# Patient Record
Sex: FEMALE | Race: BLACK OR AFRICAN AMERICAN | NOT HISPANIC OR LATINO | Employment: OTHER | URBAN - NONMETROPOLITAN AREA
[De-identification: names, ages, dates, MRNs, and addresses within clinical notes are randomized per-mention and may not be internally consistent; named-entity substitution may affect disease eponyms.]

---

## 2019-01-01 ENCOUNTER — ANTICOAGULATION VISIT (OUTPATIENT)
Dept: CARDIAC SURGERY | Facility: CLINIC | Age: 79
End: 2019-01-01

## 2019-01-01 ENCOUNTER — APPOINTMENT (OUTPATIENT)
Dept: CARDIOLOGY | Facility: HOSPITAL | Age: 79
End: 2019-01-01

## 2019-01-01 ENCOUNTER — APPOINTMENT (OUTPATIENT)
Dept: CT IMAGING | Facility: HOSPITAL | Age: 79
End: 2019-01-01

## 2019-01-01 ENCOUNTER — READMISSION MANAGEMENT (OUTPATIENT)
Dept: CALL CENTER | Facility: HOSPITAL | Age: 79
End: 2019-01-01

## 2019-01-01 ENCOUNTER — LAB REQUISITION (OUTPATIENT)
Dept: LAB | Facility: HOSPITAL | Age: 79
End: 2019-01-01

## 2019-01-01 ENCOUNTER — HOSPITAL ENCOUNTER (INPATIENT)
Facility: HOSPITAL | Age: 79
LOS: 5 days | Discharge: HOME HEALTH | End: 2019-11-08
Attending: INTERNAL MEDICINE | Admitting: HOSPITALIST

## 2019-01-01 VITALS
HEART RATE: 64 BPM | OXYGEN SATURATION: 94 % | BODY MASS INDEX: 20.45 KG/M2 | TEMPERATURE: 97.3 F | WEIGHT: 119.8 LBS | SYSTOLIC BLOOD PRESSURE: 92 MMHG | DIASTOLIC BLOOD PRESSURE: 64 MMHG | HEIGHT: 64 IN | RESPIRATION RATE: 18 BRPM

## 2019-01-01 DIAGNOSIS — I21.4 NSTEMI (NON-ST ELEVATED MYOCARDIAL INFARCTION) (HCC): Primary | ICD-10-CM

## 2019-01-01 DIAGNOSIS — I21.4 NON-ST ELEVATION (NSTEMI) MYOCARDIAL INFARCTION (HCC): ICD-10-CM

## 2019-01-01 DIAGNOSIS — Z74.09 IMPAIRED PHYSICAL MOBILITY: ICD-10-CM

## 2019-01-01 DIAGNOSIS — I26.99 OTHER ACUTE PULMONARY EMBOLISM WITHOUT ACUTE COR PULMONALE (HCC): ICD-10-CM

## 2019-01-01 DIAGNOSIS — E87.5 HYPERKALEMIA: ICD-10-CM

## 2019-01-01 DIAGNOSIS — I11.9 HYPERTENSIVE HEART DISEASE WITHOUT HEART FAILURE: ICD-10-CM

## 2019-01-01 DIAGNOSIS — R42 ORTHOSTATIC DIZZINESS: ICD-10-CM

## 2019-01-01 LAB
ALBUMIN SERPL-MCNC: 3.5 G/DL (ref 3.5–5.2)
ALBUMIN SERPL-MCNC: 3.9 G/DL (ref 3.5–5.2)
ALBUMIN/GLOB SERPL: 1.1 G/DL
ALBUMIN/GLOB SERPL: 1.3 G/DL
ALP SERPL-CCNC: 113 U/L (ref 39–117)
ALP SERPL-CCNC: 92 U/L (ref 39–117)
ALT SERPL W P-5'-P-CCNC: 15 U/L (ref 1–33)
ALT SERPL W P-5'-P-CCNC: 21 U/L (ref 1–33)
ANION GAP SERPL CALCULATED.3IONS-SCNC: 10 MMOL/L (ref 5–15)
ANION GAP SERPL CALCULATED.3IONS-SCNC: 14 MMOL/L (ref 5–15)
ANION GAP SERPL CALCULATED.3IONS-SCNC: 14 MMOL/L (ref 5–15)
ANION GAP SERPL CALCULATED.3IONS-SCNC: 16 MMOL/L (ref 5–15)
ANION GAP SERPL CALCULATED.3IONS-SCNC: 26 MMOL/L (ref 5–15)
ANION GAP SERPL CALCULATED.3IONS-SCNC: 9 MMOL/L (ref 5–15)
AST SERPL-CCNC: 26 U/L (ref 1–32)
AST SERPL-CCNC: 36 U/L (ref 1–32)
BASOPHILS # BLD AUTO: 0.02 10*3/MM3 (ref 0–0.2)
BASOPHILS # BLD AUTO: 0.03 10*3/MM3 (ref 0–0.2)
BASOPHILS # BLD AUTO: 0.04 10*3/MM3 (ref 0–0.2)
BASOPHILS # BLD AUTO: 0.04 10*3/MM3 (ref 0–0.2)
BASOPHILS NFR BLD AUTO: 0.5 % (ref 0–1.5)
BASOPHILS NFR BLD AUTO: 0.5 % (ref 0–1.5)
BASOPHILS NFR BLD AUTO: 0.6 % (ref 0–1.5)
BASOPHILS NFR BLD AUTO: 0.7 % (ref 0–1.5)
BASOPHILS NFR BLD AUTO: 0.8 % (ref 0–1.5)
BASOPHILS NFR BLD AUTO: 1 % (ref 0–1.5)
BH CV ECHO MEAS - ACS: 1.7 CM
BH CV ECHO MEAS - AO ISTHMUS: 2.1 CM
BH CV ECHO MEAS - AO MAX PG (FULL): 4.4 MMHG
BH CV ECHO MEAS - AO MAX PG: 6.1 MMHG
BH CV ECHO MEAS - AO MEAN PG (FULL): 2 MMHG
BH CV ECHO MEAS - AO MEAN PG: 3 MMHG
BH CV ECHO MEAS - AO ROOT AREA (BSA CORRECTED): 1.9
BH CV ECHO MEAS - AO ROOT AREA: 7.5 CM^2
BH CV ECHO MEAS - AO ROOT DIAM: 3.1 CM
BH CV ECHO MEAS - AO V2 MAX: 123 CM/SEC
BH CV ECHO MEAS - AO V2 MEAN: 80.9 CM/SEC
BH CV ECHO MEAS - AO V2 VTI: 18.4 CM
BH CV ECHO MEAS - ASC AORTA: 2.6 CM
BH CV ECHO MEAS - AVA(I,A): 1.8 CM^2
BH CV ECHO MEAS - AVA(I,D): 1.8 CM^2
BH CV ECHO MEAS - AVA(V,A): 1.3 CM^2
BH CV ECHO MEAS - AVA(V,D): 1.3 CM^2
BH CV ECHO MEAS - BSA(HAYCOCK): 1.6 M^2
BH CV ECHO MEAS - BSA: 1.6 M^2
BH CV ECHO MEAS - BZI_BMI: 21.1 KILOGRAMS/M^2
BH CV ECHO MEAS - BZI_METRIC_HEIGHT: 162.6 CM
BH CV ECHO MEAS - BZI_METRIC_WEIGHT: 55.8 KG
BH CV ECHO MEAS - EDV(CUBED): 24.6 ML
BH CV ECHO MEAS - EDV(TEICH): 32.5 ML
BH CV ECHO MEAS - EF(CUBED): 69 %
BH CV ECHO MEAS - EF(TEICH): 62.3 %
BH CV ECHO MEAS - ESV(CUBED): 7.6 ML
BH CV ECHO MEAS - ESV(TEICH): 12.2 ML
BH CV ECHO MEAS - FS: 32.3 %
BH CV ECHO MEAS - IVS/LVPW: 0.97
BH CV ECHO MEAS - IVSD: 1.2 CM
BH CV ECHO MEAS - LA DIMENSION: 2.5 CM
BH CV ECHO MEAS - LA/AO: 0.81
BH CV ECHO MEAS - LV MASS(C)D: 100.5 GRAMS
BH CV ECHO MEAS - LV MASS(C)DI: 63.2 GRAMS/M^2
BH CV ECHO MEAS - LV MAX PG: 1.7 MMHG
BH CV ECHO MEAS - LV MEAN PG: 1 MMHG
BH CV ECHO MEAS - LV V1 MAX: 65 CM/SEC
BH CV ECHO MEAS - LV V1 MEAN: 50 CM/SEC
BH CV ECHO MEAS - LV V1 VTI: 13.3 CM
BH CV ECHO MEAS - LVIDD: 2.9 CM
BH CV ECHO MEAS - LVIDS: 2 CM
BH CV ECHO MEAS - LVOT AREA (M): 2.5 CM^2
BH CV ECHO MEAS - LVOT AREA: 2.5 CM^2
BH CV ECHO MEAS - LVOT DIAM: 1.8 CM
BH CV ECHO MEAS - LVPWD: 1.2 CM
BH CV ECHO MEAS - MV A MAX VEL: 69.5 CM/SEC
BH CV ECHO MEAS - MV DEC SLOPE: 263 CM/SEC^2
BH CV ECHO MEAS - MV E MAX VEL: 28.5 CM/SEC
BH CV ECHO MEAS - MV E/A: 0.41
BH CV ECHO MEAS - MV MAX PG: 4.8 MMHG
BH CV ECHO MEAS - MV MEAN PG: 2 MMHG
BH CV ECHO MEAS - MV P1/2T MAX VEL: 45.2 CM/SEC
BH CV ECHO MEAS - MV P1/2T: 50.3 MSEC
BH CV ECHO MEAS - MV V2 MAX: 110 CM/SEC
BH CV ECHO MEAS - MV V2 MEAN: 62.5 CM/SEC
BH CV ECHO MEAS - MV V2 VTI: 15.2 CM
BH CV ECHO MEAS - MVA P1/2T LCG: 4.9 CM^2
BH CV ECHO MEAS - MVA(P1/2T): 4.4 CM^2
BH CV ECHO MEAS - MVA(VTI): 2.2 CM^2
BH CV ECHO MEAS - PA MAX PG: 3 MMHG
BH CV ECHO MEAS - PA V2 MAX: 86.1 CM/SEC
BH CV ECHO MEAS - RAP SYSTOLE: 10 MMHG
BH CV ECHO MEAS - RVDD: 3.1 CM
BH CV ECHO MEAS - RVSP: 151.6 MMHG
BH CV ECHO MEAS - SI(AO): 87.3 ML/M^2
BH CV ECHO MEAS - SI(CUBED): 10.7 ML/M^2
BH CV ECHO MEAS - SI(LVOT): 21.3 ML/M^2
BH CV ECHO MEAS - SI(TEICH): 12.7 ML/M^2
BH CV ECHO MEAS - SV(AO): 138.9 ML
BH CV ECHO MEAS - SV(CUBED): 17 ML
BH CV ECHO MEAS - SV(LVOT): 33.8 ML
BH CV ECHO MEAS - SV(TEICH): 20.2 ML
BH CV ECHO MEAS - TR MAX VEL: 595 CM/SEC
BILIRUB SERPL-MCNC: 0.7 MG/DL (ref 0.2–1.2)
BILIRUB SERPL-MCNC: 0.7 MG/DL (ref 0.2–1.2)
BUN BLD-MCNC: 10 MG/DL (ref 8–23)
BUN BLD-MCNC: 11 MG/DL (ref 8–23)
BUN BLD-MCNC: 13 MG/DL (ref 8–23)
BUN BLD-MCNC: 13 MG/DL (ref 8–23)
BUN BLD-MCNC: 14 MG/DL (ref 8–23)
BUN BLD-MCNC: 9 MG/DL (ref 8–23)
BUN/CREAT SERPL: 11.4 (ref 7–25)
BUN/CREAT SERPL: 12.8 (ref 7–25)
BUN/CREAT SERPL: 14.3 (ref 7–25)
BUN/CREAT SERPL: 16.9 (ref 7–25)
BUN/CREAT SERPL: 17.1 (ref 7–25)
BUN/CREAT SERPL: 18.1 (ref 7–25)
CALCIUM SPEC-SCNC: 8.7 MG/DL (ref 8.6–10.5)
CALCIUM SPEC-SCNC: 9.3 MG/DL (ref 8.6–10.5)
CALCIUM SPEC-SCNC: 9.4 MG/DL (ref 8.6–10.5)
CALCIUM SPEC-SCNC: 9.6 MG/DL (ref 8.6–10.5)
CALCIUM SPEC-SCNC: 9.6 MG/DL (ref 8.6–10.5)
CALCIUM SPEC-SCNC: 9.8 MG/DL (ref 8.6–10.5)
CHLORIDE SERPL-SCNC: 100 MMOL/L (ref 98–107)
CHLORIDE SERPL-SCNC: 100 MMOL/L (ref 98–107)
CHLORIDE SERPL-SCNC: 103 MMOL/L (ref 98–107)
CHLORIDE SERPL-SCNC: 105 MMOL/L (ref 98–107)
CHLORIDE SERPL-SCNC: 99 MMOL/L (ref 98–107)
CHLORIDE SERPL-SCNC: 99 MMOL/L (ref 98–107)
CHOLEST SERPL-MCNC: 168 MG/DL (ref 0–200)
CO2 SERPL-SCNC: 15 MMOL/L (ref 22–29)
CO2 SERPL-SCNC: 19 MMOL/L (ref 22–29)
CO2 SERPL-SCNC: 22 MMOL/L (ref 22–29)
CO2 SERPL-SCNC: 23 MMOL/L (ref 22–29)
CO2 SERPL-SCNC: 24 MMOL/L (ref 22–29)
CO2 SERPL-SCNC: 26 MMOL/L (ref 22–29)
CREAT BLD-MCNC: 0.72 MG/DL (ref 0.57–1)
CREAT BLD-MCNC: 0.77 MG/DL (ref 0.57–1)
CREAT BLD-MCNC: 0.77 MG/DL (ref 0.57–1)
CREAT BLD-MCNC: 0.78 MG/DL (ref 0.57–1)
CREAT BLD-MCNC: 0.79 MG/DL (ref 0.57–1)
CREAT BLD-MCNC: 0.82 MG/DL (ref 0.57–1)
D-DIMER, QUANTITATIVE (MAD,POW, STR): >4000 NG/ML (FEU) (ref 0–470)
DEPRECATED RDW RBC AUTO: 36.3 FL (ref 37–54)
DEPRECATED RDW RBC AUTO: 36.5 FL (ref 37–54)
DEPRECATED RDW RBC AUTO: 37.1 FL (ref 37–54)
DEPRECATED RDW RBC AUTO: 37.4 FL (ref 37–54)
DEPRECATED RDW RBC AUTO: 37.6 FL (ref 37–54)
DEPRECATED RDW RBC AUTO: 38.6 FL (ref 37–54)
DEPRECATED RDW RBC AUTO: 51.4 FL (ref 37–54)
EOSINOPHIL # BLD AUTO: 0.01 10*3/MM3 (ref 0–0.4)
EOSINOPHIL # BLD AUTO: 0.03 10*3/MM3 (ref 0–0.4)
EOSINOPHIL # BLD AUTO: 0.03 10*3/MM3 (ref 0–0.4)
EOSINOPHIL # BLD AUTO: 0.05 10*3/MM3 (ref 0–0.4)
EOSINOPHIL # BLD AUTO: 0.05 10*3/MM3 (ref 0–0.4)
EOSINOPHIL # BLD AUTO: 0.08 10*3/MM3 (ref 0–0.4)
EOSINOPHIL NFR BLD AUTO: 0.2 % (ref 0.3–6.2)
EOSINOPHIL NFR BLD AUTO: 0.6 % (ref 0.3–6.2)
EOSINOPHIL NFR BLD AUTO: 0.7 % (ref 0.3–6.2)
EOSINOPHIL NFR BLD AUTO: 1.1 % (ref 0.3–6.2)
EOSINOPHIL NFR BLD AUTO: 1.2 % (ref 0.3–6.2)
EOSINOPHIL NFR BLD AUTO: 1.9 % (ref 0.3–6.2)
ERYTHROCYTE [DISTWIDTH] IN BLOOD BY AUTOMATED COUNT: 14.6 % (ref 12.3–15.4)
ERYTHROCYTE [DISTWIDTH] IN BLOOD BY AUTOMATED COUNT: 14.7 % (ref 12.3–15.4)
ERYTHROCYTE [DISTWIDTH] IN BLOOD BY AUTOMATED COUNT: 14.7 % (ref 12.3–15.4)
ERYTHROCYTE [DISTWIDTH] IN BLOOD BY AUTOMATED COUNT: 14.8 % (ref 12.3–15.4)
ERYTHROCYTE [DISTWIDTH] IN BLOOD BY AUTOMATED COUNT: 15.3 % (ref 12.3–15.4)
ERYTHROCYTE [DISTWIDTH] IN BLOOD BY AUTOMATED COUNT: 15.7 % (ref 12.3–15.4)
ERYTHROCYTE [DISTWIDTH] IN BLOOD BY AUTOMATED COUNT: 19.4 % (ref 12.3–15.4)
GFR SERPL CREATININE-BSD FRML MDRD: 81 ML/MIN/1.73
GFR SERPL CREATININE-BSD FRML MDRD: 85 ML/MIN/1.73
GFR SERPL CREATININE-BSD FRML MDRD: 86 ML/MIN/1.73
GFR SERPL CREATININE-BSD FRML MDRD: 88 ML/MIN/1.73
GFR SERPL CREATININE-BSD FRML MDRD: 88 ML/MIN/1.73
GFR SERPL CREATININE-BSD FRML MDRD: 95 ML/MIN/1.73
GLOBULIN UR ELPH-MCNC: 3 GM/DL
GLOBULIN UR ELPH-MCNC: 3.2 GM/DL
GLUCOSE BLD-MCNC: 32 MG/DL (ref 65–99)
GLUCOSE BLD-MCNC: 53 MG/DL (ref 65–99)
GLUCOSE BLD-MCNC: 60 MG/DL (ref 65–99)
GLUCOSE BLD-MCNC: 68 MG/DL (ref 65–99)
GLUCOSE BLD-MCNC: 72 MG/DL (ref 65–99)
GLUCOSE BLD-MCNC: 93 MG/DL (ref 65–99)
GLUCOSE BLDC GLUCOMTR-MCNC: 100 MG/DL (ref 70–130)
GLUCOSE BLDC GLUCOMTR-MCNC: 101 MG/DL (ref 70–130)
GLUCOSE BLDC GLUCOMTR-MCNC: 103 MG/DL (ref 70–130)
GLUCOSE BLDC GLUCOMTR-MCNC: 105 MG/DL (ref 70–130)
GLUCOSE BLDC GLUCOMTR-MCNC: 106 MG/DL (ref 70–130)
GLUCOSE BLDC GLUCOMTR-MCNC: 107 MG/DL (ref 70–130)
GLUCOSE BLDC GLUCOMTR-MCNC: 113 MG/DL (ref 70–130)
GLUCOSE BLDC GLUCOMTR-MCNC: 246 MG/DL (ref 70–130)
GLUCOSE BLDC GLUCOMTR-MCNC: 30 MG/DL (ref 70–130)
GLUCOSE BLDC GLUCOMTR-MCNC: 60 MG/DL (ref 70–130)
GLUCOSE BLDC GLUCOMTR-MCNC: 61 MG/DL (ref 70–130)
GLUCOSE BLDC GLUCOMTR-MCNC: 71 MG/DL (ref 70–130)
GLUCOSE BLDC GLUCOMTR-MCNC: 77 MG/DL (ref 70–130)
GLUCOSE BLDC GLUCOMTR-MCNC: 94 MG/DL (ref 70–130)
HBA1C MFR BLD: 5.3 % (ref 4.8–5.6)
HCT VFR BLD AUTO: 35.5 % (ref 34–46.6)
HCT VFR BLD AUTO: 36.8 % (ref 34–46.6)
HCT VFR BLD AUTO: 38.3 % (ref 34–46.6)
HCT VFR BLD AUTO: 39.8 % (ref 34–46.6)
HCT VFR BLD AUTO: 42.2 % (ref 34–46.6)
HCT VFR BLD AUTO: 44.4 % (ref 34–46.6)
HCT VFR BLD AUTO: 47.5 % (ref 34–46.6)
HDLC SERPL-MCNC: 36 MG/DL (ref 40–60)
HGB BLD-MCNC: 12.9 G/DL (ref 12–15.9)
HGB BLD-MCNC: 13.4 G/DL (ref 12–15.9)
HGB BLD-MCNC: 13.8 G/DL (ref 12–15.9)
HGB BLD-MCNC: 14.5 G/DL (ref 12–15.9)
HGB BLD-MCNC: 15.1 G/DL (ref 12–15.9)
HGB BLD-MCNC: 16 G/DL (ref 12–15.9)
HGB BLD-MCNC: 16.6 G/DL (ref 12–15.9)
IMM GRANULOCYTES # BLD AUTO: 0.01 10*3/MM3 (ref 0–0.05)
IMM GRANULOCYTES # BLD AUTO: 0.02 10*3/MM3 (ref 0–0.05)
IMM GRANULOCYTES NFR BLD AUTO: 0.2 % (ref 0–0.5)
IMM GRANULOCYTES NFR BLD AUTO: 0.3 % (ref 0–0.5)
LDLC SERPL CALC-MCNC: 111 MG/DL (ref 0–100)
LDLC/HDLC SERPL: 3.08 {RATIO}
LYMPHOCYTES # BLD AUTO: 1.11 10*3/MM3 (ref 0.7–3.1)
LYMPHOCYTES # BLD AUTO: 1.6 10*3/MM3 (ref 0.7–3.1)
LYMPHOCYTES # BLD AUTO: 1.62 10*3/MM3 (ref 0.7–3.1)
LYMPHOCYTES # BLD AUTO: 1.64 10*3/MM3 (ref 0.7–3.1)
LYMPHOCYTES # BLD AUTO: 1.7 10*3/MM3 (ref 0.7–3.1)
LYMPHOCYTES # BLD AUTO: 1.77 10*3/MM3 (ref 0.7–3.1)
LYMPHOCYTES NFR BLD AUTO: 26.4 % (ref 19.6–45.3)
LYMPHOCYTES NFR BLD AUTO: 27 % (ref 19.6–45.3)
LYMPHOCYTES NFR BLD AUTO: 32.3 % (ref 19.6–45.3)
LYMPHOCYTES NFR BLD AUTO: 35.9 % (ref 19.6–45.3)
LYMPHOCYTES NFR BLD AUTO: 39 % (ref 19.6–45.3)
LYMPHOCYTES NFR BLD AUTO: 43.2 % (ref 19.6–45.3)
MAGNESIUM SERPL-MCNC: 1.7 MG/DL (ref 1.6–2.4)
MAXIMAL PREDICTED HEART RATE: 141 BPM
MCH RBC QN AUTO: 25.7 PG (ref 26.6–33)
MCH RBC QN AUTO: 25.8 PG (ref 26.6–33)
MCH RBC QN AUTO: 25.9 PG (ref 26.6–33)
MCH RBC QN AUTO: 26 PG (ref 26.6–33)
MCH RBC QN AUTO: 27.5 PG (ref 26.6–33)
MCHC RBC AUTO-ENTMCNC: 34.9 G/DL (ref 31.5–35.7)
MCHC RBC AUTO-ENTMCNC: 35.8 G/DL (ref 31.5–35.7)
MCHC RBC AUTO-ENTMCNC: 36 G/DL (ref 31.5–35.7)
MCHC RBC AUTO-ENTMCNC: 36 G/DL (ref 31.5–35.7)
MCHC RBC AUTO-ENTMCNC: 36.3 G/DL (ref 31.5–35.7)
MCHC RBC AUTO-ENTMCNC: 36.4 G/DL (ref 31.5–35.7)
MCHC RBC AUTO-ENTMCNC: 36.4 G/DL (ref 31.5–35.7)
MCV RBC AUTO: 70.8 FL (ref 79–97)
MCV RBC AUTO: 71.1 FL (ref 79–97)
MCV RBC AUTO: 71.4 FL (ref 79–97)
MCV RBC AUTO: 71.8 FL (ref 79–97)
MCV RBC AUTO: 72.1 FL (ref 79–97)
MCV RBC AUTO: 73.5 FL (ref 79–97)
MCV RBC AUTO: 75.7 FL (ref 79–97)
MONOCYTES # BLD AUTO: 0.45 10*3/MM3 (ref 0.1–0.9)
MONOCYTES # BLD AUTO: 0.48 10*3/MM3 (ref 0.1–0.9)
MONOCYTES # BLD AUTO: 0.5 10*3/MM3 (ref 0.1–0.9)
MONOCYTES # BLD AUTO: 0.56 10*3/MM3 (ref 0.1–0.9)
MONOCYTES # BLD AUTO: 0.57 10*3/MM3 (ref 0.1–0.9)
MONOCYTES # BLD AUTO: 0.63 10*3/MM3 (ref 0.1–0.9)
MONOCYTES NFR BLD AUTO: 10.9 % (ref 5–12)
MONOCYTES NFR BLD AUTO: 12.1 % (ref 5–12)
MONOCYTES NFR BLD AUTO: 13.7 % (ref 5–12)
MONOCYTES NFR BLD AUTO: 15.2 % (ref 5–12)
MONOCYTES NFR BLD AUTO: 8.3 % (ref 5–12)
MONOCYTES NFR BLD AUTO: 9.4 % (ref 5–12)
NEUTROPHILS # BLD AUTO: 1.7 10*3/MM3 (ref 1.7–7)
NEUTROPHILS # BLD AUTO: 1.77 10*3/MM3 (ref 1.7–7)
NEUTROPHILS # BLD AUTO: 2.37 10*3/MM3 (ref 1.7–7)
NEUTROPHILS # BLD AUTO: 2.47 10*3/MM3 (ref 1.7–7)
NEUTROPHILS # BLD AUTO: 2.88 10*3/MM3 (ref 1.7–7)
NEUTROPHILS # BLD AUTO: 3.89 10*3/MM3 (ref 1.7–7)
NEUTROPHILS NFR BLD AUTO: 41.5 % (ref 42.7–76)
NEUTROPHILS NFR BLD AUTO: 42.7 % (ref 42.7–76)
NEUTROPHILS NFR BLD AUTO: 50.1 % (ref 42.7–76)
NEUTROPHILS NFR BLD AUTO: 56.7 % (ref 42.7–76)
NEUTROPHILS NFR BLD AUTO: 60.2 % (ref 42.7–76)
NEUTROPHILS NFR BLD AUTO: 64.3 % (ref 42.7–76)
NRBC BLD AUTO-RTO: 0 /100 WBC (ref 0–0.2)
NRBC BLD AUTO-RTO: 0.5 /100 WBC (ref 0–0.2)
NRBC BLD AUTO-RTO: 0.6 /100 WBC (ref 0–0.2)
NT-PROBNP SERPL-MCNC: 4654 PG/ML (ref 5–1800)
NT-PROBNP SERPL-MCNC: 8066 PG/ML (ref 5–1800)
PHOSPHATE SERPL-MCNC: 3.6 MG/DL (ref 2.5–4.5)
PLATELET # BLD AUTO: 167 10*3/MM3 (ref 140–450)
PLATELET # BLD AUTO: 168 10*3/MM3 (ref 140–450)
PLATELET # BLD AUTO: 174 10*3/MM3 (ref 140–450)
PLATELET # BLD AUTO: 179 10*3/MM3 (ref 140–450)
PLATELET # BLD AUTO: 185 10*3/MM3 (ref 140–450)
PMV BLD AUTO: 10 FL (ref 6–12)
PMV BLD AUTO: 10.1 FL (ref 6–12)
PMV BLD AUTO: 10.3 FL (ref 6–12)
PMV BLD AUTO: 9.6 FL (ref 6–12)
PMV BLD AUTO: 9.6 FL (ref 6–12)
PMV BLD AUTO: 9.7 FL (ref 6–12)
PMV BLD AUTO: 9.9 FL (ref 6–12)
POTASSIUM BLD-SCNC: 2.9 MMOL/L (ref 3.5–5.2)
POTASSIUM BLD-SCNC: 3.9 MMOL/L (ref 3.5–5.2)
POTASSIUM BLD-SCNC: 3.9 MMOL/L (ref 3.5–5.2)
POTASSIUM BLD-SCNC: 4 MMOL/L (ref 3.5–5.2)
POTASSIUM BLD-SCNC: 5 MMOL/L (ref 3.5–5.2)
POTASSIUM BLD-SCNC: 5.4 MMOL/L (ref 3.5–5.2)
POTASSIUM BLD-SCNC: 5.4 MMOL/L (ref 3.5–5.2)
POTASSIUM BLD-SCNC: 6.2 MMOL/L (ref 3.5–5.2)
PROT SERPL-MCNC: 6.7 G/DL (ref 6–8.5)
PROT SERPL-MCNC: 6.9 G/DL (ref 6–8.5)
RBC # BLD AUTO: 4.69 10*6/MM3 (ref 3.77–5.28)
RBC # BLD AUTO: 5.2 10*6/MM3 (ref 3.77–5.28)
RBC # BLD AUTO: 5.31 10*6/MM3 (ref 3.77–5.28)
RBC # BLD AUTO: 5.6 10*6/MM3 (ref 3.77–5.28)
RBC # BLD AUTO: 5.88 10*6/MM3 (ref 3.77–5.28)
RBC # BLD AUTO: 6.22 10*6/MM3 (ref 3.77–5.28)
RBC # BLD AUTO: 6.46 10*6/MM3 (ref 3.77–5.28)
RBC MORPH BLD: NORMAL
SMALL PLATELETS BLD QL SMEAR: ADEQUATE
SODIUM BLD-SCNC: 132 MMOL/L (ref 136–145)
SODIUM BLD-SCNC: 135 MMOL/L (ref 136–145)
SODIUM BLD-SCNC: 137 MMOL/L (ref 136–145)
SODIUM BLD-SCNC: 138 MMOL/L (ref 136–145)
SODIUM BLD-SCNC: 141 MMOL/L (ref 136–145)
SODIUM BLD-SCNC: 141 MMOL/L (ref 136–145)
STRESS TARGET HR: 120 BPM
TRIGL SERPL-MCNC: 105 MG/DL (ref 0–150)
TROPONIN T SERPL-MCNC: 0.18 NG/ML (ref 0–0.03)
TROPONIN T SERPL-MCNC: 0.18 NG/ML (ref 0–0.03)
TSH SERPL DL<=0.05 MIU/L-ACNC: 2.41 UIU/ML (ref 0.27–4.2)
VLDLC SERPL-MCNC: 21 MG/DL
WBC MORPH BLD: NORMAL
WBC NRBC COR # BLD: 4.1 10*3/MM3 (ref 3.4–10.8)
WBC NRBC COR # BLD: 4.11 10*3/MM3 (ref 3.4–10.8)
WBC NRBC COR # BLD: 4.15 10*3/MM3 (ref 3.4–10.8)
WBC NRBC COR # BLD: 4.73 10*3/MM3 (ref 3.4–10.8)
WBC NRBC COR # BLD: 5.08 10*3/MM3 (ref 3.4–10.8)
WBC NRBC COR # BLD: 6.05 10*3/MM3 (ref 3.4–10.8)
WBC NRBC COR # BLD: 6.18 10*3/MM3 (ref 3.4–10.8)

## 2019-01-01 PROCEDURE — 80048 BASIC METABOLIC PNL TOTAL CA: CPT | Performed by: INTERNAL MEDICINE

## 2019-01-01 PROCEDURE — 99233 SBSQ HOSP IP/OBS HIGH 50: CPT | Performed by: INTERNAL MEDICINE

## 2019-01-01 PROCEDURE — 99232 SBSQ HOSP IP/OBS MODERATE 35: CPT | Performed by: INTERNAL MEDICINE

## 2019-01-01 PROCEDURE — 25010000002 FUROSEMIDE PER 20 MG: Performed by: INTERNAL MEDICINE

## 2019-01-01 PROCEDURE — 82962 GLUCOSE BLOOD TEST: CPT

## 2019-01-01 PROCEDURE — 85027 COMPLETE CBC AUTOMATED: CPT | Performed by: INTERNAL MEDICINE

## 2019-01-01 PROCEDURE — 84443 ASSAY THYROID STIM HORMONE: CPT | Performed by: INTERNAL MEDICINE

## 2019-01-01 PROCEDURE — 25010000002 HEPARIN (PORCINE) PER 1000 UNITS: Performed by: INTERNAL MEDICINE

## 2019-01-01 PROCEDURE — 94799 UNLISTED PULMONARY SVC/PX: CPT

## 2019-01-01 PROCEDURE — 84484 ASSAY OF TROPONIN QUANT: CPT | Performed by: INTERNAL MEDICINE

## 2019-01-01 PROCEDURE — 71275 CT ANGIOGRAPHY CHEST: CPT

## 2019-01-01 PROCEDURE — 83880 ASSAY OF NATRIURETIC PEPTIDE: CPT | Performed by: INTERNAL MEDICINE

## 2019-01-01 PROCEDURE — 85025 COMPLETE CBC W/AUTO DIFF WBC: CPT | Performed by: HOSPITALIST

## 2019-01-01 PROCEDURE — 63710000001 INSULIN REGULAR HUMAN PER 5 UNITS: Performed by: INTERNAL MEDICINE

## 2019-01-01 PROCEDURE — 80048 BASIC METABOLIC PNL TOTAL CA: CPT | Performed by: HOSPITALIST

## 2019-01-01 PROCEDURE — C1760 CLOSURE DEV, VASC: HCPCS | Performed by: INTERNAL MEDICINE

## 2019-01-01 PROCEDURE — 85007 BL SMEAR W/DIFF WBC COUNT: CPT | Performed by: HOSPITALIST

## 2019-01-01 PROCEDURE — 25010000002 CALCIUM GLUCONATE PER 10 ML: Performed by: INTERNAL MEDICINE

## 2019-01-01 PROCEDURE — 94760 N-INVAS EAR/PLS OXIMETRY 1: CPT

## 2019-01-01 PROCEDURE — 80053 COMPREHEN METABOLIC PANEL: CPT | Performed by: FAMILY MEDICINE

## 2019-01-01 PROCEDURE — 84132 ASSAY OF SERUM POTASSIUM: CPT | Performed by: INTERNAL MEDICINE

## 2019-01-01 PROCEDURE — 99233 SBSQ HOSP IP/OBS HIGH 50: CPT | Performed by: NURSE PRACTITIONER

## 2019-01-01 PROCEDURE — 84132 ASSAY OF SERUM POTASSIUM: CPT | Performed by: HOSPITALIST

## 2019-01-01 PROCEDURE — B2151ZZ FLUOROSCOPY OF LEFT HEART USING LOW OSMOLAR CONTRAST: ICD-10-PCS | Performed by: INTERNAL MEDICINE

## 2019-01-01 PROCEDURE — 83036 HEMOGLOBIN GLYCOSYLATED A1C: CPT | Performed by: INTERNAL MEDICINE

## 2019-01-01 PROCEDURE — 84100 ASSAY OF PHOSPHORUS: CPT | Performed by: INTERNAL MEDICINE

## 2019-01-01 PROCEDURE — 85025 COMPLETE CBC W/AUTO DIFF WBC: CPT | Performed by: FAMILY MEDICINE

## 2019-01-01 PROCEDURE — 80053 COMPREHEN METABOLIC PANEL: CPT | Performed by: INTERNAL MEDICINE

## 2019-01-01 PROCEDURE — 0 IOPAMIDOL PER 1 ML: Performed by: INTERNAL MEDICINE

## 2019-01-01 PROCEDURE — 85025 COMPLETE CBC W/AUTO DIFF WBC: CPT | Performed by: INTERNAL MEDICINE

## 2019-01-01 PROCEDURE — 25010000002 ONDANSETRON PER 1 MG: Performed by: INTERNAL MEDICINE

## 2019-01-01 PROCEDURE — 93458 L HRT ARTERY/VENTRICLE ANGIO: CPT | Performed by: INTERNAL MEDICINE

## 2019-01-01 PROCEDURE — 80061 LIPID PANEL: CPT | Performed by: INTERNAL MEDICINE

## 2019-01-01 PROCEDURE — 93306 TTE W/DOPPLER COMPLETE: CPT

## 2019-01-01 PROCEDURE — B2111ZZ FLUOROSCOPY OF MULTIPLE CORONARY ARTERIES USING LOW OSMOLAR CONTRAST: ICD-10-PCS | Performed by: INTERNAL MEDICINE

## 2019-01-01 PROCEDURE — C1769 GUIDE WIRE: HCPCS | Performed by: INTERNAL MEDICINE

## 2019-01-01 PROCEDURE — C1894 INTRO/SHEATH, NON-LASER: HCPCS | Performed by: INTERNAL MEDICINE

## 2019-01-01 PROCEDURE — 25010000002 MIDAZOLAM PER 1 MG: Performed by: INTERNAL MEDICINE

## 2019-01-01 PROCEDURE — 97110 THERAPEUTIC EXERCISES: CPT

## 2019-01-01 PROCEDURE — 83735 ASSAY OF MAGNESIUM: CPT | Performed by: INTERNAL MEDICINE

## 2019-01-01 PROCEDURE — 63710000001 INSULIN ASPART PER 5 UNITS: Performed by: HOSPITALIST

## 2019-01-01 PROCEDURE — 4A023N7 MEASUREMENT OF CARDIAC SAMPLING AND PRESSURE, LEFT HEART, PERCUTANEOUS APPROACH: ICD-10-PCS | Performed by: INTERNAL MEDICINE

## 2019-01-01 PROCEDURE — 85379 FIBRIN DEGRADATION QUANT: CPT | Performed by: INTERNAL MEDICINE

## 2019-01-01 PROCEDURE — 97530 THERAPEUTIC ACTIVITIES: CPT

## 2019-01-01 PROCEDURE — 97162 PT EVAL MOD COMPLEX 30 MIN: CPT

## 2019-01-01 PROCEDURE — 93010 ELECTROCARDIOGRAM REPORT: CPT | Performed by: INTERNAL MEDICINE

## 2019-01-01 PROCEDURE — 93306 TTE W/DOPPLER COMPLETE: CPT | Performed by: INTERNAL MEDICINE

## 2019-01-01 PROCEDURE — 99223 1ST HOSP IP/OBS HIGH 75: CPT | Performed by: INTERNAL MEDICINE

## 2019-01-01 PROCEDURE — 93005 ELECTROCARDIOGRAM TRACING: CPT | Performed by: INTERNAL MEDICINE

## 2019-01-01 RX ORDER — ONDANSETRON 4 MG/1
4 TABLET, FILM COATED ORAL EVERY 6 HOURS PRN
Status: DISCONTINUED | OUTPATIENT
Start: 2019-01-01 | End: 2019-01-01 | Stop reason: SDUPTHER

## 2019-01-01 RX ORDER — ONDANSETRON 2 MG/ML
4 INJECTION INTRAMUSCULAR; INTRAVENOUS EVERY 6 HOURS PRN
Status: DISCONTINUED | OUTPATIENT
Start: 2019-01-01 | End: 2019-01-01 | Stop reason: HOSPADM

## 2019-01-01 RX ORDER — DOCUSATE SODIUM 100 MG/1
100 CAPSULE, LIQUID FILLED ORAL 2 TIMES DAILY
Status: DISCONTINUED | OUTPATIENT
Start: 2019-01-01 | End: 2019-01-01 | Stop reason: HOSPADM

## 2019-01-01 RX ORDER — MAGNESIUM SULFATE HEPTAHYDRATE 40 MG/ML
2 INJECTION, SOLUTION INTRAVENOUS AS NEEDED
Status: DISCONTINUED | OUTPATIENT
Start: 2019-01-01 | End: 2019-01-01 | Stop reason: HOSPADM

## 2019-01-01 RX ORDER — DEXTROSE MONOHYDRATE 25 G/50ML
50 INJECTION, SOLUTION INTRAVENOUS ONCE
Status: COMPLETED | OUTPATIENT
Start: 2019-01-01 | End: 2019-01-01

## 2019-01-01 RX ORDER — FUROSEMIDE 10 MG/ML
40 INJECTION INTRAMUSCULAR; INTRAVENOUS EVERY 12 HOURS
Status: COMPLETED | OUTPATIENT
Start: 2019-01-01 | End: 2019-01-01

## 2019-01-01 RX ORDER — SODIUM CHLORIDE 0.9 % (FLUSH) 0.9 %
10 SYRINGE (ML) INJECTION AS NEEDED
Status: DISCONTINUED | OUTPATIENT
Start: 2019-01-01 | End: 2019-01-01 | Stop reason: HOSPADM

## 2019-01-01 RX ORDER — FENTANYL CITRATE 50 UG/ML
25 INJECTION, SOLUTION INTRAMUSCULAR; INTRAVENOUS
Status: DISCONTINUED | OUTPATIENT
Start: 2019-01-01 | End: 2019-01-01 | Stop reason: SDUPTHER

## 2019-01-01 RX ORDER — IPRATROPIUM BROMIDE AND ALBUTEROL SULFATE 2.5; .5 MG/3ML; MG/3ML
SOLUTION RESPIRATORY (INHALATION)
COMMUNITY

## 2019-01-01 RX ORDER — HEPARIN SODIUM 1000 [USP'U]/ML
INJECTION, SOLUTION INTRAVENOUS; SUBCUTANEOUS AS NEEDED
Status: DISCONTINUED | OUTPATIENT
Start: 2019-01-01 | End: 2019-01-01 | Stop reason: HOSPADM

## 2019-01-01 RX ORDER — FUROSEMIDE 20 MG/1
20 TABLET ORAL DAILY
COMMUNITY
End: 2019-01-01 | Stop reason: HOSPADM

## 2019-01-01 RX ORDER — FAMOTIDINE 40 MG/1
40 TABLET, FILM COATED ORAL DAILY
Status: DISCONTINUED | OUTPATIENT
Start: 2019-01-01 | End: 2019-01-01 | Stop reason: HOSPADM

## 2019-01-01 RX ORDER — DEXTROSE MONOHYDRATE 25 G/50ML
25 INJECTION, SOLUTION INTRAVENOUS
Status: DISCONTINUED | OUTPATIENT
Start: 2019-01-01 | End: 2019-01-01 | Stop reason: SDUPTHER

## 2019-01-01 RX ORDER — LANOLIN ALCOHOL/MO/W.PET/CERES
3 CREAM (GRAM) TOPICAL NIGHTLY PRN
Qty: 30 TABLET | Refills: 0 | Status: SHIPPED | OUTPATIENT
Start: 2019-01-01

## 2019-01-01 RX ORDER — TEMAZEPAM 7.5 MG/1
7.5 CAPSULE ORAL NIGHTLY PRN
Status: DISCONTINUED | OUTPATIENT
Start: 2019-01-01 | End: 2019-01-01 | Stop reason: HOSPADM

## 2019-01-01 RX ORDER — LANCETS
1 EACH MISCELLANEOUS 4 TIMES DAILY
Qty: 100 EACH | Refills: 0 | Status: SHIPPED | OUTPATIENT
Start: 2019-01-01

## 2019-01-01 RX ORDER — ONDANSETRON 2 MG/ML
4 INJECTION INTRAMUSCULAR; INTRAVENOUS EVERY 6 HOURS PRN
Status: DISCONTINUED | OUTPATIENT
Start: 2019-01-01 | End: 2019-01-01 | Stop reason: SDUPTHER

## 2019-01-01 RX ORDER — METOPROLOL SUCCINATE 25 MG/1
12.5 TABLET, EXTENDED RELEASE ORAL
Qty: 15 TABLET | Refills: 0 | Status: SHIPPED | OUTPATIENT
Start: 2019-01-01 | End: 2019-01-01

## 2019-01-01 RX ORDER — OMEPRAZOLE 20 MG/1
20 CAPSULE, DELAYED RELEASE ORAL 2 TIMES DAILY
COMMUNITY
End: 2019-01-01 | Stop reason: HOSPADM

## 2019-01-01 RX ORDER — NICOTINE POLACRILEX 4 MG
15 LOZENGE BUCCAL
Status: DISCONTINUED | OUTPATIENT
Start: 2019-01-01 | End: 2019-01-01 | Stop reason: SDUPTHER

## 2019-01-01 RX ORDER — ACETAMINOPHEN 325 MG/1
650 TABLET ORAL EVERY 4 HOURS PRN
Status: DISCONTINUED | OUTPATIENT
Start: 2019-01-01 | End: 2019-01-01 | Stop reason: HOSPADM

## 2019-01-01 RX ORDER — SODIUM CHLORIDE 9 MG/ML
75 INJECTION, SOLUTION INTRAVENOUS CONTINUOUS
Status: DISCONTINUED | OUTPATIENT
Start: 2019-01-01 | End: 2019-01-01

## 2019-01-01 RX ORDER — NALOXONE HCL 0.4 MG/ML
0.4 VIAL (ML) INJECTION
Status: DISCONTINUED | OUTPATIENT
Start: 2019-01-01 | End: 2019-01-01 | Stop reason: HOSPADM

## 2019-01-01 RX ORDER — SODIUM POLYSTYRENE SULFONATE 15 G/60ML
15 SUSPENSION ORAL; RECTAL ONCE
Status: COMPLETED | OUTPATIENT
Start: 2019-01-01 | End: 2019-01-01

## 2019-01-01 RX ORDER — CLOPIDOGREL BISULFATE 75 MG/1
75 TABLET ORAL DAILY
Status: DISCONTINUED | OUTPATIENT
Start: 2019-01-01 | End: 2019-01-01 | Stop reason: HOSPADM

## 2019-01-01 RX ORDER — ACETAMINOPHEN 160 MG/5ML
650 SOLUTION ORAL EVERY 4 HOURS PRN
Status: DISCONTINUED | OUTPATIENT
Start: 2019-01-01 | End: 2019-01-01 | Stop reason: SDUPTHER

## 2019-01-01 RX ORDER — MORPHINE SULFATE 2 MG/ML
1 INJECTION, SOLUTION INTRAMUSCULAR; INTRAVENOUS EVERY 4 HOURS PRN
Status: DISCONTINUED | OUTPATIENT
Start: 2019-01-01 | End: 2019-01-01 | Stop reason: HOSPADM

## 2019-01-01 RX ORDER — POTASSIUM CHLORIDE 750 MG/1
40 CAPSULE, EXTENDED RELEASE ORAL AS NEEDED
Status: DISCONTINUED | OUTPATIENT
Start: 2019-01-01 | End: 2019-01-01 | Stop reason: HOSPADM

## 2019-01-01 RX ORDER — ISOSORBIDE MONONITRATE 30 MG/1
30 TABLET, EXTENDED RELEASE ORAL
Status: DISCONTINUED | OUTPATIENT
Start: 2019-01-01 | End: 2019-01-01

## 2019-01-01 RX ORDER — POTASSIUM CHLORIDE 1.5 G/1.77G
40 POWDER, FOR SOLUTION ORAL AS NEEDED
Status: DISCONTINUED | OUTPATIENT
Start: 2019-01-01 | End: 2019-01-01 | Stop reason: HOSPADM

## 2019-01-01 RX ORDER — POTASSIUM CHLORIDE 750 MG/1
10 CAPSULE, EXTENDED RELEASE ORAL DAILY
COMMUNITY
End: 2019-01-01 | Stop reason: HOSPADM

## 2019-01-01 RX ORDER — PSEUDOEPHEDRINE HCL 30 MG
100 TABLET ORAL 2 TIMES DAILY
Qty: 60 EACH | Refills: 0 | Status: SHIPPED | OUTPATIENT
Start: 2019-01-01 | End: 2019-01-01

## 2019-01-01 RX ORDER — SODIUM CHLORIDE 9 MG/ML
100 INJECTION, SOLUTION INTRAVENOUS CONTINUOUS
Status: DISCONTINUED | OUTPATIENT
Start: 2019-01-01 | End: 2019-01-01

## 2019-01-01 RX ORDER — ALUMINA, MAGNESIA, AND SIMETHICONE 2400; 2400; 240 MG/30ML; MG/30ML; MG/30ML
15 SUSPENSION ORAL EVERY 6 HOURS PRN
Status: DISCONTINUED | OUTPATIENT
Start: 2019-01-01 | End: 2019-01-01

## 2019-01-01 RX ORDER — METOPROLOL SUCCINATE 25 MG
12.5 TABLET, EXTENDED RELEASE 24 HR ORAL
Status: DISCONTINUED | OUTPATIENT
Start: 2019-01-01 | End: 2019-01-01 | Stop reason: HOSPADM

## 2019-01-01 RX ORDER — LANOLIN ALCOHOL/MO/W.PET/CERES
3 CREAM (GRAM) TOPICAL NIGHTLY PRN
Status: DISCONTINUED | OUTPATIENT
Start: 2019-01-01 | End: 2019-01-01 | Stop reason: HOSPADM

## 2019-01-01 RX ORDER — NALOXONE HCL 0.4 MG/ML
0.4 VIAL (ML) INJECTION
Status: DISCONTINUED | OUTPATIENT
Start: 2019-01-01 | End: 2019-01-01 | Stop reason: SDUPTHER

## 2019-01-01 RX ORDER — SODIUM CHLORIDE 0.9 % (FLUSH) 0.9 %
10 SYRINGE (ML) INJECTION EVERY 12 HOURS SCHEDULED
Status: DISCONTINUED | OUTPATIENT
Start: 2019-01-01 | End: 2019-01-01 | Stop reason: HOSPADM

## 2019-01-01 RX ORDER — CARVEDILOL 3.12 MG/1
3.12 TABLET ORAL 2 TIMES DAILY WITH MEALS
COMMUNITY
End: 2019-01-01 | Stop reason: HOSPADM

## 2019-01-01 RX ORDER — LIDOCAINE HYDROCHLORIDE 20 MG/ML
INJECTION, SOLUTION INFILTRATION; PERINEURAL AS NEEDED
Status: DISCONTINUED | OUTPATIENT
Start: 2019-01-01 | End: 2019-01-01 | Stop reason: HOSPADM

## 2019-01-01 RX ORDER — ACETAMINOPHEN 650 MG/1
650 SUPPOSITORY RECTAL EVERY 4 HOURS PRN
Status: DISCONTINUED | OUTPATIENT
Start: 2019-01-01 | End: 2019-01-01 | Stop reason: SDUPTHER

## 2019-01-01 RX ORDER — MIDAZOLAM HYDROCHLORIDE 1 MG/ML
INJECTION INTRAMUSCULAR; INTRAVENOUS AS NEEDED
Status: DISCONTINUED | OUTPATIENT
Start: 2019-01-01 | End: 2019-01-01 | Stop reason: HOSPADM

## 2019-01-01 RX ORDER — DEXTROSE MONOHYDRATE 25 G/50ML
25 INJECTION, SOLUTION INTRAVENOUS
Status: DISCONTINUED | OUTPATIENT
Start: 2019-01-01 | End: 2019-01-01 | Stop reason: HOSPADM

## 2019-01-01 RX ORDER — HYDROCODONE BITARTRATE AND ACETAMINOPHEN 7.5; 325 MG/1; MG/1
1 TABLET ORAL EVERY 4 HOURS PRN
Status: DISCONTINUED | OUTPATIENT
Start: 2019-01-01 | End: 2019-01-01 | Stop reason: HOSPADM

## 2019-01-01 RX ORDER — BLOOD-GLUCOSE METER
1 KIT MISCELLANEOUS AS NEEDED
Qty: 1 EACH | Refills: 0 | Status: SHIPPED | OUTPATIENT
Start: 2019-01-01

## 2019-01-01 RX ORDER — SODIUM CHLORIDE 0.9 % (FLUSH) 0.9 %
3 SYRINGE (ML) INJECTION EVERY 12 HOURS SCHEDULED
Status: DISCONTINUED | OUTPATIENT
Start: 2019-01-01 | End: 2019-01-01 | Stop reason: HOSPADM

## 2019-01-01 RX ORDER — MIDODRINE HYDROCHLORIDE 2.5 MG/1
2.5 TABLET ORAL
Status: DISCONTINUED | OUTPATIENT
Start: 2019-01-01 | End: 2019-01-01

## 2019-01-01 RX ORDER — ACETAMINOPHEN 325 MG/1
650 TABLET ORAL EVERY 4 HOURS PRN
Status: DISCONTINUED | OUTPATIENT
Start: 2019-01-01 | End: 2019-01-01 | Stop reason: SDUPTHER

## 2019-01-01 RX ORDER — RANITIDINE 150 MG/1
150 TABLET ORAL 2 TIMES DAILY
COMMUNITY
End: 2019-01-01 | Stop reason: HOSPADM

## 2019-01-01 RX ORDER — PRAVASTATIN SODIUM 40 MG
80 TABLET ORAL DAILY
COMMUNITY
End: 2020-01-01 | Stop reason: HOSPADM

## 2019-01-01 RX ORDER — CLOPIDOGREL BISULFATE 75 MG/1
75 TABLET ORAL DAILY
Qty: 30 TABLET | Refills: 0 | Status: SHIPPED | OUTPATIENT
Start: 2019-01-01 | End: 2019-01-01

## 2019-01-01 RX ORDER — FAMOTIDINE 40 MG/1
40 TABLET, FILM COATED ORAL DAILY
Qty: 30 TABLET | Refills: 0 | Status: SHIPPED | OUTPATIENT
Start: 2019-01-01 | End: 2019-01-01

## 2019-01-01 RX ORDER — NICOTINE POLACRILEX 4 MG
15 LOZENGE BUCCAL
Status: DISCONTINUED | OUTPATIENT
Start: 2019-01-01 | End: 2019-01-01 | Stop reason: HOSPADM

## 2019-01-01 RX ORDER — DEXTROSE MONOHYDRATE 25 G/50ML
50 INJECTION, SOLUTION INTRAVENOUS
Status: DISCONTINUED | OUTPATIENT
Start: 2019-01-01 | End: 2019-01-01 | Stop reason: HOSPADM

## 2019-01-01 RX ORDER — MAGNESIUM SULFATE HEPTAHYDRATE 40 MG/ML
4 INJECTION, SOLUTION INTRAVENOUS AS NEEDED
Status: DISCONTINUED | OUTPATIENT
Start: 2019-01-01 | End: 2019-01-01 | Stop reason: HOSPADM

## 2019-01-01 RX ORDER — SODIUM CHLORIDE 9 MG/ML
1 INJECTION, SOLUTION INTRAVENOUS CONTINUOUS
Status: ACTIVE | OUTPATIENT
Start: 2019-01-01 | End: 2019-01-01

## 2019-01-01 RX ORDER — SODIUM CHLORIDE 9 MG/ML
50 INJECTION, SOLUTION INTRAVENOUS CONTINUOUS
Status: DISCONTINUED | OUTPATIENT
Start: 2019-01-01 | End: 2019-01-01 | Stop reason: SDUPTHER

## 2019-01-01 RX ORDER — POTASSIUM CHLORIDE 7.45 MG/ML
10 INJECTION INTRAVENOUS
Status: DISCONTINUED | OUTPATIENT
Start: 2019-01-01 | End: 2019-01-01 | Stop reason: HOSPADM

## 2019-01-01 RX ORDER — MORPHINE SULFATE 2 MG/ML
1 INJECTION, SOLUTION INTRAMUSCULAR; INTRAVENOUS EVERY 4 HOURS PRN
Status: DISCONTINUED | OUTPATIENT
Start: 2019-01-01 | End: 2019-01-01 | Stop reason: SDUPTHER

## 2019-01-01 RX ORDER — ONDANSETRON 4 MG/1
4 TABLET, FILM COATED ORAL EVERY 6 HOURS PRN
Status: DISCONTINUED | OUTPATIENT
Start: 2019-01-01 | End: 2019-01-01 | Stop reason: HOSPADM

## 2019-01-01 RX ORDER — DIPHENHYDRAMINE HYDROCHLORIDE 50 MG/ML
25 INJECTION INTRAMUSCULAR; INTRAVENOUS EVERY 6 HOURS PRN
Status: DISCONTINUED | OUTPATIENT
Start: 2019-01-01 | End: 2019-01-01 | Stop reason: HOSPADM

## 2019-01-01 RX ORDER — DEXTROSE MONOHYDRATE 25 G/50ML
50 INJECTION, SOLUTION INTRAVENOUS
Status: DISCONTINUED | OUTPATIENT
Start: 2019-01-01 | End: 2019-01-01 | Stop reason: SDUPTHER

## 2019-01-01 RX ADMIN — Medication 12.5 MG: at 10:46

## 2019-01-01 RX ADMIN — SODIUM CHLORIDE 75 ML/HR: 9 INJECTION, SOLUTION INTRAVENOUS at 08:41

## 2019-01-01 RX ADMIN — RIVAROXABAN 15 MG: 15 TABLET, FILM COATED ORAL at 08:12

## 2019-01-01 RX ADMIN — POTASSIUM CHLORIDE 40 MEQ: 750 CAPSULE, EXTENDED RELEASE ORAL at 16:43

## 2019-01-01 RX ADMIN — DEXTROSE MONOHYDRATE 50 ML: 500 INJECTION PARENTERAL at 07:44

## 2019-01-01 RX ADMIN — ONDANSETRON 4 MG: 2 INJECTION INTRAMUSCULAR; INTRAVENOUS at 08:40

## 2019-01-01 RX ADMIN — CLOPIDOGREL BISULFATE 75 MG: 75 TABLET ORAL at 09:56

## 2019-01-01 RX ADMIN — FUROSEMIDE 40 MG: 10 INJECTION, SOLUTION INTRAMUSCULAR; INTRAVENOUS at 17:32

## 2019-01-01 RX ADMIN — FAMOTIDINE 40 MG: 40 TABLET ORAL at 08:23

## 2019-01-01 RX ADMIN — POTASSIUM CHLORIDE 40 MEQ: 750 CAPSULE, EXTENDED RELEASE ORAL at 12:16

## 2019-01-01 RX ADMIN — DOCUSATE SODIUM 100 MG: 100 CAPSULE, LIQUID FILLED ORAL at 20:41

## 2019-01-01 RX ADMIN — FAMOTIDINE 40 MG: 40 TABLET ORAL at 09:56

## 2019-01-01 RX ADMIN — FUROSEMIDE 40 MG: 10 INJECTION, SOLUTION INTRAMUSCULAR; INTRAVENOUS at 05:59

## 2019-01-01 RX ADMIN — SODIUM CHLORIDE, PRESERVATIVE FREE 10 ML: 5 INJECTION INTRAVENOUS at 00:27

## 2019-01-01 RX ADMIN — CLOPIDOGREL BISULFATE 75 MG: 75 TABLET ORAL at 15:36

## 2019-01-01 RX ADMIN — RIVAROXABAN 15 MG: 15 TABLET, FILM COATED ORAL at 08:23

## 2019-01-01 RX ADMIN — SODIUM CHLORIDE 50 ML/HR: 900 INJECTION, SOLUTION INTRAVENOUS at 00:54

## 2019-01-01 RX ADMIN — CLOPIDOGREL BISULFATE 75 MG: 75 TABLET ORAL at 08:12

## 2019-01-01 RX ADMIN — SODIUM CHLORIDE 100 ML/HR: 9 INJECTION, SOLUTION INTRAVENOUS at 06:37

## 2019-01-01 RX ADMIN — HYDROCODONE BITARTRATE AND ACETAMINOPHEN 1 TABLET: 7.5; 325 TABLET ORAL at 17:56

## 2019-01-01 RX ADMIN — SODIUM CHLORIDE 75 ML/HR: 9 INJECTION, SOLUTION INTRAVENOUS at 20:42

## 2019-01-01 RX ADMIN — METOPROLOL TARTRATE 12.5 MG: 25 TABLET, FILM COATED ORAL at 22:06

## 2019-01-01 RX ADMIN — DEXTROSE MONOHYDRATE 25 G: 500 INJECTION PARENTERAL at 06:42

## 2019-01-01 RX ADMIN — SODIUM CHLORIDE, PRESERVATIVE FREE 10 ML: 5 INJECTION INTRAVENOUS at 08:24

## 2019-01-01 RX ADMIN — RIVAROXABAN 15 MG: 15 TABLET, FILM COATED ORAL at 09:55

## 2019-01-01 RX ADMIN — RIVAROXABAN 15 MG: 15 TABLET, FILM COATED ORAL at 09:26

## 2019-01-01 RX ADMIN — CALCIUM GLUCONATE 1 G: 98 INJECTION, SOLUTION INTRAVENOUS at 23:04

## 2019-01-01 RX ADMIN — RIVAROXABAN 15 MG: 15 TABLET, FILM COATED ORAL at 17:32

## 2019-01-01 RX ADMIN — DOCUSATE SODIUM 100 MG: 100 CAPSULE, LIQUID FILLED ORAL at 09:56

## 2019-01-01 RX ADMIN — FUROSEMIDE 40 MG: 10 INJECTION, SOLUTION INTRAMUSCULAR; INTRAVENOUS at 19:43

## 2019-01-01 RX ADMIN — FUROSEMIDE 40 MG: 10 INJECTION, SOLUTION INTRAMUSCULAR; INTRAVENOUS at 06:37

## 2019-01-01 RX ADMIN — CLOPIDOGREL BISULFATE 75 MG: 75 TABLET ORAL at 08:23

## 2019-01-01 RX ADMIN — INSULIN ASPART 3 UNITS: 100 INJECTION, SOLUTION INTRAVENOUS; SUBCUTANEOUS at 20:42

## 2019-01-01 RX ADMIN — ISOSORBIDE MONONITRATE 30 MG: 30 TABLET, EXTENDED RELEASE ORAL at 15:36

## 2019-01-01 RX ADMIN — METOPROLOL TARTRATE 12.5 MG: 25 TABLET, FILM COATED ORAL at 09:26

## 2019-01-01 RX ADMIN — DOCUSATE SODIUM 100 MG: 100 CAPSULE, LIQUID FILLED ORAL at 08:23

## 2019-01-01 RX ADMIN — DOCUSATE SODIUM 100 MG: 100 CAPSULE, LIQUID FILLED ORAL at 08:12

## 2019-01-01 RX ADMIN — RIVAROXABAN 15 MG: 15 TABLET, FILM COATED ORAL at 17:51

## 2019-01-01 RX ADMIN — DEXTROSE MONOHYDRATE 50 ML: 500 INJECTION PARENTERAL at 23:04

## 2019-01-01 RX ADMIN — SODIUM POLYSTYRENE SULFONATE 15 G: 15 SUSPENSION ORAL; RECTAL at 23:04

## 2019-01-01 RX ADMIN — FAMOTIDINE 40 MG: 40 TABLET ORAL at 08:12

## 2019-01-01 RX ADMIN — RIVAROXABAN 15 MG: 15 TABLET, FILM COATED ORAL at 16:44

## 2019-01-01 RX ADMIN — IOPAMIDOL 71 ML: 755 INJECTION, SOLUTION INTRAVENOUS at 15:01

## 2019-01-01 RX ADMIN — HUMAN INSULIN 10 UNITS: 100 INJECTION, SOLUTION SUBCUTANEOUS at 23:04

## 2019-01-01 RX ADMIN — DOCUSATE SODIUM 100 MG: 100 CAPSULE, LIQUID FILLED ORAL at 22:07

## 2019-01-01 RX ADMIN — DOCUSATE SODIUM 100 MG: 100 CAPSULE, LIQUID FILLED ORAL at 09:26

## 2019-01-01 RX ADMIN — POTASSIUM CHLORIDE 40 MEQ: 750 CAPSULE, EXTENDED RELEASE ORAL at 08:11

## 2019-01-01 RX ADMIN — SODIUM CHLORIDE 100 ML/HR: 9 INJECTION, SOLUTION INTRAVENOUS at 18:03

## 2019-01-01 RX ADMIN — SODIUM CHLORIDE, PRESERVATIVE FREE 10 ML: 5 INJECTION INTRAVENOUS at 20:41

## 2019-01-01 RX ADMIN — RIVAROXABAN 15 MG: 15 TABLET, FILM COATED ORAL at 18:38

## 2019-01-01 RX ADMIN — SODIUM CHLORIDE 50 ML/HR: 900 INJECTION, SOLUTION INTRAVENOUS at 06:03

## 2019-01-01 RX ADMIN — FAMOTIDINE 40 MG: 40 TABLET ORAL at 09:26

## 2019-11-03 PROBLEM — I21.4 NSTEMI (NON-ST ELEVATED MYOCARDIAL INFARCTION) (HCC): Status: ACTIVE | Noted: 2019-01-01

## 2019-11-04 NOTE — PROGRESS NOTES
[facility] Medicine Services  INPATIENT PROGRESS NOTE    Length of Stay: 1  Date of Admission: 11/3/2019  Primary Care Physician: Dominick Sandy MD    Subjective   Chief Complaint: Chest pain  HPI:  Patient states that she is pain free this morning.  She states that she is not short of breath currently.    Review of Systems   Constitutional: Positive for fatigue. Negative for appetite change, chills, fever and unexpected weight change.   Respiratory: Negative for cough, choking, chest tightness, shortness of breath and wheezing.    Cardiovascular: Positive for chest pain. Negative for palpitations and leg swelling.   Gastrointestinal: Negative for abdominal pain, blood in stool, constipation, diarrhea, nausea and vomiting.   Genitourinary: Negative for dysuria, flank pain and hematuria.   Neurological: Negative for dizziness, seizures, syncope, speech difficulty, weakness, light-headedness, numbness and headaches.   Hematological: Does not bruise/bleed easily.        All pertinent negatives and positives are as above. All other systems have been reviewed and are negative unless otherwise stated.     Objective    Temp:  [97 °F (36.1 °C)] 97 °F (36.1 °C)  Heart Rate:  [72-78] 75  Resp:  [16-19] 19  BP: (122-146)/(72-88) 146/88    Physical Exam   Constitutional: She appears well-developed and well-nourished.   HENT:   Head: Normocephalic and atraumatic.   Eyes: EOM are normal. Pupils are equal, round, and reactive to light.   Neck: Normal range of motion. Neck supple.   Cardiovascular: Normal rate, regular rhythm and normal heart sounds. Exam reveals no gallop and no friction rub.   No murmur heard.  Pulmonary/Chest: Effort normal and breath sounds normal. No respiratory distress. She has no wheezes. She has no rales. She exhibits no tenderness.   Abdominal: Soft. Bowel sounds are normal. She exhibits no distension. There is no tenderness. There is no guarding.    Musculoskeletal: She exhibits no edema.   Skin: Skin is warm and dry.   Psychiatric: She has a normal mood and affect. Her behavior is normal. Thought content normal.   Vitals reviewed.      Results Review:  I have reviewed the labs, radiology results, and diagnostic studies.    Laboratory Data:   Results from last 7 days   Lab Units 11/04/19  0609   SODIUM mmol/L 138   POTASSIUM mmol/L 3.9   CHLORIDE mmol/L 103   CO2 mmol/L 19.0*   BUN mg/dL 13   CREATININE mg/dL 0.77   GLUCOSE mg/dL 53*   CALCIUM mg/dL 9.6   BILIRUBIN mg/dL 0.7   ALK PHOS U/L 113   ALT (SGPT) U/L 15   AST (SGOT) U/L 36*   ANION GAP mmol/L 16.0*     Estimated Creatinine Clearance: 50.4 mL/min (by C-G formula based on SCr of 0.77 mg/dL).  Results from last 7 days   Lab Units 11/04/19  0609   MAGNESIUM mg/dL 1.7   PHOSPHORUS mg/dL 3.6         Results from last 7 days   Lab Units 11/04/19  0559 11/03/19  2343   WBC 10*3/mm3 5.08 6.05   HEMOGLOBIN g/dL 15.1 16.6*   HEMATOCRIT % 42.2 47.5*   PLATELETS 10*3/mm3 185 174           Culture Data:   No results found for: BLOODCX  No results found for: URINECX  No results found for: RESPCX  No results found for: WOUNDCX  No results found for: STOOLCX  No components found for: BODYFLD    Radiology Data:   Imaging Results (Last 24 Hours)     ** No results found for the last 24 hours. **          I have reviewed the patient's current medications.     Assessment/Plan     Active Hospital Problems    Diagnosis   • NSTEMI (non-ST elevated myocardial infarction) (CMS/Formerly [facility])       Plan:    1.  Non-ST elevation myocardial infarction: Seen by cardiology.  Plan for left heart cath today.  Will start on beta-blocker.  Will start on aspirin after heart cath.  2.  Diabetes mellitus: Continue current treatment.  3.  GERD: Continue current treatment.    Discharge Planning: I expect patient to be discharged to home in 2-3 days.        This document has been electronically signed by Rigo Alford MD on November 4, 2019 2:10  PM

## 2019-11-04 NOTE — H&P
[facility] Medicine Admission    Date of Admission: 11/3/2019    Primary Care Physician: Dominick Sandy MD    Chief Complaint: I have a chest pain    HPI:The patient is a 78-year old  woman who was transferred to this facility from [facility] with a history of chest pain.  According to her, she has had recurrent episodes of chest pain for at least 1 month or more.  She decided to go to [facility] yesterday because her symptoms seem to have gotten worse.  She has shortness of breath and a cough which is productive for whitish sputum.  She has no fever or chills, no palpitations, headache, PND, orthopnea.  She did not characterize her pain as such.  Pain is not effort related.  Initial troponin obtained at the referring hospital was said to be slightly elevated and was reported as 0.160.  Subsequent values was 0.182 and 0.177.    The patient was completely pain-free when I saw her.    Past Medical History:  has no past medical history on file.   She has a history of gastroesophageal reflux disease, diabetes mellitus, dyslipidemia, and osteoarthritis.    Past Surgical History:  has no past surgical history on file.   No significant history    Family History: family history is not on file.    Social History:  She has a distant history of tobacco use quitting almost 20 years ago.  She does not drink alcoholic beverages or use recreational drugs    Allergies: Allergies not on file she is allergic to NSAIDs    Medications:   Prior to Admission medications    Not on File   This includes Coreg 3.125 mg p.o. twice daily  Metformin 500 mg p.o. twice daily  Pravastatin 80 mg p.o. nightly  Ranitidine 150 mg p.o. twice daily     Review of Systems:  Review of Systems   Otherwise complete ROS is negative except as mentioned above.      Heart Rate    75  78  Heart Rate      BP    135/78  141/79  BP     Noninvasive MAP (mmHg)    101  105  Noninvasive MAP (mmHg)     Weight   62 kg (136 lb...      Weight              Physical exam:  Constitutional:  Well-developed and well-nourished.  No respiratory distress.      HENT:  Head:  Normocephalic and atraumatic.  Mouth:  Moist mucous membranes.    Eyes:  Conjunctivae and EOM are normal.  Pupils are equal, round, and reactive to light.  No scleral icterus.    Neck:  Neck supple.  No JVD present.    Cardiovascular:  Normal rate, regular rhythm and normal heart sounds with no murmur.  Pulmonary/Chest:  No respiratory distress, no wheezes, no crackles, with reduced breath sounds.    Abdominal:  Soft.  Bowel sounds are normal.  No distension and no tenderness.   Musculoskeletal:   Bilateral lower extremity  edema, no tenderness, and no deformity.  No red or swollen joints anywhere.    Neurological:  Alert and oriented to person, place, and time.  No cranial nerve deficit.  No tongue deviation.  No facial droop.  No slurred speech.   Skin:  Skin is warm and dry. No rash noted. No pallor.   Peripheral vascular:  no clubbing, no cyanosis.    Results Reviewed:  I have personally reviewed current lab, radiology, and data and agree with results.  Lab Results (last 24 hours)     ** No results found for the last 24 hours. **        Imaging Results (Last 24 Hours)     ** No results found for the last 24 hours. **          Assessment:    Active Hospital Problems    Diagnosis   • NSTEMI (non-ST elevated myocardial infarction) (CMS/Formerly [facility])         Plan:  The pattern of troponin elevation is not consistent with an acute coronary ischemia at this time.  We will continue to trend troponin  Reconcile home medications  A.m. labs  I discussed the patients findings and my recommendations with the patient.    Polo Mora MD  11/03/19  10:33 PM

## 2019-11-04 NOTE — CONSULTS
Adult Nutrition  Assessment    Patient Name:  Nancy Shepherd  YOB: 1940  MRN: [id number]  Admit Date:  11/3/2019    Assessment Date:  11/4/2019    Comments:  Pt transferred to [facility] due to NSTEMI.  She is currently NPO awaiting cardiology.  Pt reports a decreased appetite with a hx of wt loss over the past 2 years,  From 204# in 2017 to 130# in 2019 to current wt of 123#.  She denies any Gi distress.  RD will add Boost GC when PO diet is initiated.  Physical signs of malnutrition present with fat loss and muscle wasting.  Of note, she has lost ~40% of her UBW in the paste 2 years per her report.      Reason for Assessment     Row Name 11/04/19 1155          Reason for Assessment    Reason For Assessment  identified at risk by screening criteria     Diagnosis  cardiac disease     Identified At Risk by Screening Criteria  MST SCORE 2+;unintentional loss of 10 lbs or more in the past 2 mos;reduced oral intake over the last month         Nutrition/Diet History     Row Name 11/04/19 1158          Nutrition/Diet History    Typical Food/Fluid Intake  Pt states that in 2017 she weighed 204# and now she weighs ~130#.  Her wt has progressive gone down in the past 2 years.  Decreased appetite and she isn't sure why.  She is willing to try Boost when she can eat.             Labs/Tests/Procedures/Meds     Row Name 11/04/19 1159          Labs/Procedures/Meds    Lab Results Reviewed  reviewed, pertinent     Lab Results Comments  Glucose 53;         Diagnostic Tests/Procedures    Diagnostic Test/Procedure Reviewed  reviewed, pertinent     Diagnostic Test/Procedures Comments  CArdiology        Medications    Pertinent Medications Reviewed  reviewed, pertinent     Pertinent Medications Comments  Lasix         Physical Findings     Row Name 11/04/19 1200          Physical Findings    Overall Physical Appearance  on oxygen therapy;loss of subcutaneous fat;loss of muscle mass         Estimated/Assessed  Needs     Row Name 11/04/19 1200          Calculation Measurements    Weight Used For Calculations  55.8 kg (123 lb)        Estimated/Assessed Needs    Additional Documentation  Additional Requirements (Group);Fluid Requirements (Group);[place]-St. Jeor Equation (Group);Protein Requirements (Group);Calorie Requirements (Group);KCAL/KG (Group)        Calorie Requirements    Estimated Calorie Requirement (kcal/day)  1400     Estimated Calorie Need Method  kcal/kg        KCAL/KG    KCAL/KG  25 Kcal/Kg (kcal)     25 Kcal/Kg (kcal)  1394.8        [place]-St. Jeor Equation    RMR ([place]-St. Jeor Equation)  1017.92        Protein Requirements    Weight Used For Protein Calculations  55.8 kg (123 lb)     Est Protein Requirement Amount (gms/kg)  1.2 gm protein     Estimated Protein Requirements (gms/day)  66.95        Fluid Requirements    Estimated Fluid Requirements (mL/day)  1500     Estimated Fluid Requirement Method  other (see comments)     RDA Method (mL)  1500     Kade-Justino Method (over 20 kg)  2615.84         Nutrition Prescription Ordered     Row Name 11/04/19 1201          Nutrition Prescription PO    Current PO Diet  NPO                 Electronically signed by:  Martha Chance RD  11/04/19 12:14 PM

## 2019-11-04 NOTE — CONSULTS
[facility] Cardiology  INPATIENT CONSULT NOTE  Nancy Shepherd  78 y.o. female    Reason for the consult: Evaded troponin, chest pain      Chief complaint: Chest pain  History of present Illness:     Patient was at home in [place] yesterday when she had chest pain.  She says she just did not feel well she walked outside.  Was chest pain it was sharp there was also some dull part really did not radiate she says she just did not feel well.  She says it was to the point that she felt like she needs to see  so she went to [facility].  There they diagnosed her with abnormal ECG with elevated troponin and transfer.          Allergies:   Allergies   Allergen Reactions   • Motrin [Ibuprofen] Swelling     Facial         Past Medical History:   Diagnosis Date   • Diabetes mellitus (CMS/[facility])    • Hypertension          History reviewed. No pertinent surgical history.      History reviewed. No pertinent family history.      Social History     Socioeconomic History   • Marital status:      Spouse name: Not on file   • Number of children: Not on file   • Years of education: Not on file   • Highest education level: Not on file   Tobacco Use   • Smoking status: Former Smoker   • Smokeless tobacco: Former User   Substance and Sexual Activity   • Alcohol use: No     Frequency: Never   • Drug use: No   • Sexual activity: Defer         Current Facility-Administered Medications   Medication Dose Route Frequency Provider Last Rate Last Dose   • acetaminophen (TYLENOL) tablet 650 mg  650 mg Oral Q4H PRN Polo Mora MD        Or   • acetaminophen (TYLENOL) 160 MG/5ML solution 650 mg  650 mg Oral Q4H PRN Polo Mora MD        Or   • acetaminophen (TYLENOL) suppository 650 mg  650 mg Rectal Q4H PRN Polo Mora MD       • docusate sodium (COLACE) capsule 100 mg  100 mg Oral BID Polo Mora MD       • famotidine (PEPCID) tablet 40 mg  40 mg Oral Daily Polo Mora  MD       • furosemide (LASIX) injection 40 mg  40 mg Intravenous Q12H Polo Mora MD   40 mg at 11/04/19 0559   • Magnesium Sulfate 2 gram Bolus, followed by 8 gram infusion (total Mg dose 10 grams)- Mg less than or equal to 1mg/dL  2 g Intravenous PRN Polo Mora MD        Or   • Magnesium Sulfate 2 gram / 50mL Infusion (GIVE X 3 BAGS TO EQUAL 6GM TOTAL DOSE) - Mg 1.1 - 1.5 mg/dl  2 g Intravenous PRN Polo Mora MD        Or   • Magnesium Sulfate 4 gram infusion- Mg 1.6-1.9 mg/dL  4 g Intravenous PRN Polo Mora MD       • melatonin tablet 3 mg  3 mg Oral Nightly PRN Polo Mora MD       • metoprolol tartrate (LOPRESSOR) half tablet 12.5 mg  12.5 mg Oral Q12H Rigo Alford MD       • morphine injection 1 mg  1 mg Intravenous Q4H PRN Polo Mora MD        And   • naloxone (NARCAN) injection 0.4 mg  0.4 mg Intravenous Q5 Min PRN Polo Mora MD       • morphine injection 1 mg  1 mg Intravenous Q4H PRN Polo Mora MD        And   • naloxone (NARCAN) injection 0.4 mg  0.4 mg Intravenous Q5 Min PRN Polo Mora MD       • ondansetron (ZOFRAN) tablet 4 mg  4 mg Oral Q6H PRN Polo Mora MD        Or   • ondansetron (ZOFRAN) injection 4 mg  4 mg Intravenous Q6H PRN Polo Mora MD       • potassium & sodium phosphates (PHOS-NAK) 280-160-250 MG packet - for Phosphorus less than 1.25 mg/dL  2 packet Oral Q6H PRN Polo Mora MD        Or   • potassium & sodium phosphates (PHOS-NAK) 280-160-250 MG packet - for Phosphorus 1.25 - 2.5 mg/dL  2 packet Oral Q6H PRN Polo Mora MD       • potassium chloride (MICRO-K) CR capsule 40 mEq  40 mEq Oral PRN Polo Mora MD        Or   • potassium chloride (KLOR-CON) packet 40 mEq  40 mEq Oral PRN Polo Mora MD        Or   • potassium chloride 10 mEq in 100 mL IVPB  10 mEq Intravenous Q1H PRN Polo Mora MD       • sodium chloride 0.9 % flush 10 mL  10 mL Intravenous Q12H  "Polo Mora MD   10 mL at 11/04/19 0027   • sodium chloride 0.9 % flush 10 mL  10 mL Intravenous PRN Polo Mora MD       • sodium chloride 0.9 % infusion  50 mL/hr Intravenous Continuous Polo Mora MD 50 mL/hr at 11/04/19 0603 50 mL/hr at 11/04/19 0603         Review of Systems:     Constitution:  Denies any fatigue, fever or chills.    HENT:  Denies any headache, hearing impairment.    Eyes:  Denies any blurring of vision, or photophobia.     Cardiovascular:  As per history of present illness.     Respiratory:  Denies any COPD, shortness of breath.     Endocrine:  No history of hyperlipidemia, diabetes.       Musculoskeletal:  No history of arthritis with musculoskeletal problems.    Gastrointestinal:  No nausea, vomiting, or melena.    Genitourinary:  No dysuria or hematuria.    Neurological:  No history of seizure disorder, stroke, or memory problems.    Psychiatric/Behavioral:  No history of depression, bipolar disorder or schizophrenia.     Hematological:  No history of easy bruising.            OBJECTIVE:    /81   Pulse 72   Temp 97 °F (36.1 °C) (Oral)   Resp 16   Ht 162.6 cm (64\")   Wt 56 kg (123 lb 7.3 oz)   SpO2 95%   BMI 21.19 kg/m²       Physical Exam:     Constitutional:  Well developed and nourished, in no acute distress .  Is oriented to person, place, and time.     Skin:  Warm and dry.     Head:  Normocephalic and atraumatic.     Eyes:  Pupils are equal, round, and reactive to light.     Neck:  Neck supple. No bruit in the carotids.  No elevation of JVD.    Cardiovascular:  [place] in the fifth intercostal space, regular rate, and rhythm.  S1 greater than S2, no S3 or S4, no gallop.     Pulmonary/Chest:  Air entry is equal on both sides.  No wheezing or crackles.      Abdominal:  Soft. No hepatosplenomegaly, bowel sounds are present.    Musculoskeletal:  No kyphoscoliosis.    Neurological:  Is alert and oriented to person, place, and time.  Cranial nerves are intact.  " No motor or sensory deficit.    Extremities:  No edema, no radial femoral delay.    Psychiatric:  The patient has a normal mood and affect.  Behavior is normal.        Lab Results (last 24 hours)     Procedure Component Value Units Date/Time    BNP [[id number]]  (Abnormal) Collected:  11/03/19 2343    Specimen:  Blood Updated:  11/04/19 0026     proBNP 8,066.0 pg/mL     Narrative:       Among patients with dyspnea, NT-proBNP is highly sensitive for the detection of acute congestive heart failure. In addition NT-proBNP of <300 pg/ml effectively rules out acute congestive heart failure with 99% negative predictive value.    CBC Auto Differential [[id number]]  (Abnormal) Collected:  11/03/19 2343    Specimen:  Blood Updated:  11/04/19 0045     WBC 6.05 10*3/mm3      RBC 6.46 10*6/mm3      Hemoglobin 16.6 g/dL      Hematocrit 47.5 %      MCV 73.5 fL      MCH 25.7 pg      MCHC 34.9 g/dL      RDW 15.7 %      RDW-SD 38.6 fl      MPV 9.6 fL      Platelets 174 10*3/mm3      Neutrophil % 64.3 %      Lymphocyte % 26.4 %      Monocyte % 8.3 %      Eosinophil % 0.2 %      Basophil % 0.5 %      Immature Grans % 0.3 %      Neutrophils, Absolute 3.89 10*3/mm3      Lymphocytes, Absolute 1.60 10*3/mm3      Monocytes, Absolute 0.50 10*3/mm3      Eosinophils, Absolute 0.01 10*3/mm3      Basophils, Absolute 0.03 10*3/mm3      Immature Grans, Absolute 0.02 10*3/mm3      nRBC 0.0 /100 WBC     TSH [[id number]]  (Normal) Collected:  11/03/19 2343    Specimen:  Blood Updated:  11/04/19 0034     TSH 2.410 uIU/mL     Troponin [[id number]]  (Abnormal) Collected:  11/03/19 2343    Specimen:  Blood Updated:  11/04/19 0030     Troponin T 0.182 ng/mL     Narrative:       Troponin T Reference Range:  <= 0.03 ng/mL-   Negative for AMI  >0.03 ng/mL-     Abnormal for myocardial necrosis.  Clinicians would have to utilize clinical acumen, EKG, Troponin and serial changes to determine if it is an Acute Myocardial Infarction or myocardial injury due to an  underlying chronic condition.     CBC Auto Differential [[id number]]  (Abnormal) Collected:  11/04/19 0559    Specimen:  Blood Updated:  11/04/19 0620     WBC 5.08 10*3/mm3      RBC 5.88 10*6/mm3      Hemoglobin 15.1 g/dL      Hematocrit 42.2 %      MCV 71.8 fL      MCH 25.7 pg      MCHC 35.8 g/dL      RDW 14.7 %      RDW-SD 37.4 fl      MPV 9.6 fL      Platelets 185 10*3/mm3      Neutrophil % 56.7 %      Lymphocyte % 32.3 %      Monocyte % 9.4 %      Eosinophil % 0.6 %      Basophil % 0.8 %      Immature Grans % 0.2 %      Neutrophils, Absolute 2.88 10*3/mm3      Lymphocytes, Absolute 1.64 10*3/mm3      Monocytes, Absolute 0.48 10*3/mm3      Eosinophils, Absolute 0.03 10*3/mm3      Basophils, Absolute 0.04 10*3/mm3      Immature Grans, Absolute 0.01 10*3/mm3      nRBC 0.0 /100 WBC     Comprehensive Metabolic Panel [[id number]]  (Abnormal) Collected:  11/04/19 0609    Specimen:  Blood Updated:  11/04/19 0641     Glucose 53 mg/dL      BUN 13 mg/dL      Creatinine 0.77 mg/dL      Sodium 138 mmol/L      Potassium 3.9 mmol/L      Chloride 103 mmol/L      CO2 19.0 mmol/L      Calcium 9.6 mg/dL      Total Protein 6.7 g/dL      Albumin 3.50 g/dL      ALT (SGPT) 15 U/L      AST (SGOT) 36 U/L      Alkaline Phosphatase 113 U/L      Total Bilirubin 0.7 mg/dL      eGFR  African Amer 88 mL/min/1.73      Globulin 3.2 gm/dL      A/G Ratio 1.1 g/dL      BUN/Creatinine Ratio 16.9     Anion Gap 16.0 mmol/L     Narrative:       GFR Normal >60  Chronic Kidney Disease <60  Kidney Failure <15    Magnesium [[id number]]  (Normal) Collected:  11/04/19 0609    Specimen:  Blood Updated:  11/04/19 0641     Magnesium 1.7 mg/dL     Phosphorus [[id number]]  (Normal) Collected:  11/04/19 0609    Specimen:  Blood Updated:  11/04/19 0641     Phosphorus 3.6 mg/dL     Troponin [[id number]]  (Abnormal) Collected:  11/04/19 0609    Specimen:  Blood Updated:  11/04/19 0634     Troponin T 0.177 ng/mL     Narrative:       Troponin T Reference Range:  <= 0.03  ng/mL-   Negative for AMI  >0.03 ng/mL-     Abnormal for myocardial necrosis.  Clinicians would have to utilize clinical acumen, EKG, Troponin and serial changes to determine if it is an Acute Myocardial Infarction or myocardial injury due to an underlying chronic condition.                  A/P: 1.  Elevated troponin this is probably in [place] to a non-STEMI though she had a echo and show she had very high pulmonary artery pressures.  One obviously could have had a pulmonary embolus so we will get a stat d-dimer and follow that with a CTA if that is elevated.    Our plan is to do a cardiac catheterization we have talked her about the risk and benefits including heart attack stroke death bleeding dying cardiac arrhythmia perforation etc. and she is agreeable.    Obviously if she has a PE we will not proceed on with a cath at this time.              This document has been electronically signed by Arleth Rosenberg MD on November 4, 2019 12:21 PM           Part of this note may be an electronic transcription/translation of spoken language to printed text using the Dragon Dictation System.

## 2019-11-04 NOTE — PLAN OF CARE
Problem: Skin Injury Risk (Adult)  Intervention: Promote/Optimize Nutrition   11/04/19 1212   Nutrition Interventions   Oral Nutrition Promotion calorie dense foods provided;calorie dense liquids provided;nutritional therapy counseling provided         Problem: Patient Care Overview  Goal: Plan of Care Review  Outcome: Ongoing (interventions implemented as appropriate)   11/04/19 1212   Coping/Psychosocial   Plan of Care Reviewed With caregiver;patient   Plan of Care Review   Progress no change   OTHER   Outcome Summary New Assessment: Pt with a hx of wt loss over the past 2 years. Signs of malnutrition. Will add supplements when po diet is initiated.

## 2019-11-04 NOTE — PLAN OF CARE
Problem: Skin Injury Risk (Adult)  Goal: Skin Health and Integrity  Outcome: Ongoing (interventions implemented as appropriate)      Problem: Patient Care Overview  Goal: Plan of Care Review  Outcome: Ongoing (interventions implemented as appropriate)   11/04/19 0552   Coping/Psychosocial   Plan of Care Reviewed With patient   Plan of Care Review   Progress improving   OTHER   Outcome Summary Patient has done exceptionally well last night. No CP or SOA.      Goal: Individualization and Mutuality  Outcome: Ongoing (interventions implemented as appropriate)    Goal: Discharge Needs Assessment  Outcome: Ongoing (interventions implemented as appropriate)    Goal: Interprofessional Rounds/Family Conf  Outcome: Ongoing (interventions implemented as appropriate)      Problem: Cardiac: ACS (Acute Coronary Syndrome) (Adult)  Goal: Signs and Symptoms of Listed Potential Problems Will be Absent, Minimized or Managed (Cardiac: ACS)  Outcome: Ongoing (interventions implemented as appropriate)

## 2019-11-05 NOTE — PROGRESS NOTES
[facility] Cardiology  INPATIENT PROGRESS NOTE    Name: Nancy Shepherd  Age/Sex: 78 y.o. female  :  1940        PCP: Dominick Sandy MD    Vital Signs  Temp:  [97.4 °F (36.3 °C)-97.8 °F (36.6 °C)] 97.5 °F (36.4 °C)  Heart Rate:  [67-79] 67  Resp:  [16-20] 20  BP: (107-166)/() 107/66  Body mass index is 19.94 kg/m².     Subjective   She had an episode today where she became bradycardia.  This was associated with some hypoglycemia.  This is now resolved.  Patient Active Problem List   Diagnosis   • NSTEMI (non-ST elevated myocardial infarction) (CMS/[facility])       Past Medical History:   Diagnosis Date   • Diabetes mellitus (CMS/[facility])    • Hypertension        Current Facility-Administered Medications   Medication Dose Route Frequency Provider Last Rate Last Dose   • acetaminophen (TYLENOL) tablet 650 mg  650 mg Oral Q4H PRN Polo Mora MD        Or   • acetaminophen (TYLENOL) 160 MG/5ML solution 650 mg  650 mg Oral Q4H PRN Polo Mora MD        Or   • acetaminophen (TYLENOL) suppository 650 mg  650 mg Rectal Q4H PRN Polo Mora MD       • acetaminophen (TYLENOL) tablet 650 mg  650 mg Oral Q4H PRN Arleth Rosenberg MD       • aluminum-magnesium hydroxide-simethicone (MAALOX MAX) 400-400-40 MG/5ML suspension 15 mL  15 mL Oral Q6H PRN Arleth Rosenberg MD       • dextrose (D50W) 25 g/ 50mL Intravenous Solution 25 g  25 g Intravenous Q15 Min PRN Aura Kern MD       • dextrose (D50W) 25 g/ 50mL Intravenous Solution 50 mL  50 mL Intravenous Q1H PRN Aura Kern MD       • dextrose (GLUTOSE) oral gel 15 g  15 g Oral Q15 Min PRN Aura Kern MD       • diphenhydrAMINE (BENADRYL) injection 25 mg  25 mg Intravenous Q6H PRN Arleth Rosenberg MD       • docusate sodium (COLACE) capsule 100 mg  100 mg Oral BID Morgan, Polo B, MD   100 mg at 19 0926   • famotidine (PEPCID) tablet 40 mg  40 mg Oral Daily Polo Mora MD   40 mg  at 11/05/19 0926   • fentaNYL citrate (PF) (SUBLIMAZE) injection 25 mcg  25 mcg Intravenous Q1H PRN Arleth Rosenberg MD       • furosemide (LASIX) injection 40 mg  40 mg Intravenous Q12H Polo Mora MD   40 mg at 11/05/19 0637   • glucagon (human recombinant) (GLUCAGEN DIAGNOSTIC) injection 1 mg  1 mg Subcutaneous Q15 Min PRN Aura Kern MD       • HYDROcodone-acetaminophen (NORCO) 7.5-325 MG per tablet 1 tablet  1 tablet Oral Q4H PRN Arleth Rosenberg MD   1 tablet at 11/04/19 1756   • insulin aspart (novoLOG) injection 0-7 Units  0-7 Units Subcutaneous 4x Daily AC & at Bedtime Aura Kern MD       • magnesium hydroxide (MILK OF MAGNESIA) suspension 2400 mg/10mL 10 mL  10 mL Oral Daily PRN Arleth Rosenberg MD       • Magnesium Sulfate 2 gram Bolus, followed by 8 gram infusion (total Mg dose 10 grams)- Mg less than or equal to 1mg/dL  2 g Intravenous PRN Polo Mora MD        Or   • Magnesium Sulfate 2 gram / 50mL Infusion (GIVE X 3 BAGS TO EQUAL 6GM TOTAL DOSE) - Mg 1.1 - 1.5 mg/dl  2 g Intravenous PRN Polo Mora MD        Or   • Magnesium Sulfate 4 gram infusion- Mg 1.6-1.9 mg/dL  4 g Intravenous PRN Polo Mora MD       • melatonin tablet 3 mg  3 mg Oral Nightly PRN Polo Mora MD       • metoprolol tartrate (LOPRESSOR) half tablet 12.5 mg  12.5 mg Oral Q12H Rigo Alford MD   12.5 mg at 11/05/19 0926   • morphine injection 1 mg  1 mg Intravenous Q4H PRN Polo Mora MD        And   • naloxone (NARCAN) injection 0.4 mg  0.4 mg Intravenous Q5 Min PRN Polo Mora MD       • morphine injection 1 mg  1 mg Intravenous Q4H PRN Polo Mora MD        And   • naloxone (NARCAN) injection 0.4 mg  0.4 mg Intravenous Q5 Min PRN Polo Mora MD       • ondansetron (ZOFRAN) tablet 4 mg  4 mg Oral Q6H PRN Polo Mora MD        Or   • ondansetron (ZOFRAN) injection 4 mg  4 mg Intravenous Q6H PRN Polo Mora MD       •  ondansetron (ZOFRAN) tablet 4 mg  4 mg Oral Q6H PRN Arleth Rosenberg MD        Or   • ondansetron (ZOFRAN) injection 4 mg  4 mg Intravenous Q6H PRN Arleth Rosenberg MD       • potassium & sodium phosphates (PHOS-NAK) 280-160-250 MG packet - for Phosphorus less than 1.25 mg/dL  2 packet Oral Q6H PRN Polo Mora MD        Or   • potassium & sodium phosphates (PHOS-NAK) 280-160-250 MG packet - for Phosphorus 1.25 - 2.5 mg/dL  2 packet Oral Q6H PRN Polo Mora MD       • potassium chloride (MICRO-K) CR capsule 40 mEq  40 mEq Oral PRN Polo Mora MD        Or   • potassium chloride (KLOR-CON) packet 40 mEq  40 mEq Oral PRN Polo Mora MD        Or   • potassium chloride 10 mEq in 100 mL IVPB  10 mEq Intravenous Q1H PRN Polo Mora MD       • rivaroxaban (XARELTO) tablet 15 mg  15 mg Oral BID With Meals Arleth Rosenberg MD   15 mg at 11/05/19 0926   • sodium chloride 0.9 % flush 10 mL  10 mL Intravenous Q12H Polo Mora MD   10 mL at 11/04/19 0027   • sodium chloride 0.9 % flush 10 mL  10 mL Intravenous PRN Polo Mora MD       • temazepam (RESTORIL) capsule 7.5 mg  7.5 mg Oral Nightly PRN Arleth Rosenberg MD           Past Surgical History:   Procedure Laterality Date   • CARDIAC CATHETERIZATION Right 11/4/2019    Procedure: Left Heart Cath;  Surgeon: Arleth Rosenberg MD;  Location: [facility] INVASIVE LOCATION;  Service: Cardiology       Social History     Socioeconomic History   • Marital status:      Spouse name: Not on file   • Number of children: Not on file   • Years of education: Not on file   • Highest education level: Not on file   Tobacco Use   • Smoking status: Former Smoker   • Smokeless tobacco: Former User   Substance and Sexual Activity   • Alcohol use: No     Frequency: Never   • Drug use: No   • Sexual activity: Defer       O/E    Neck: Supple.  No JVD, no thyroid enlargement.  Chest: Air entry equal, normal respiration.  No  rhonchi or creps.  Cardiovascular system:  Regular rate and rhythm, no murmurs.  Abdomen: Soft, no tenderness, bowel sounds present, no hepatosplenomegaly.  CNS: Alert, oriented to place and time.  No motor or sensory deficit.  Cranial nerves intact.  Musculoskeletal: No deformity of the back or spine.  Extremities:  No edema.  Pulses equal on both sides.    Lab Results (last 24 hours)     Procedure Component Value Units Date/Time    Basic Metabolic Panel [[id number]]  (Abnormal) Collected:  11/05/19 0600    Specimen:  Blood Updated:  11/05/19 1222     Glucose 32 mg/dL      BUN 11 mg/dL      Creatinine 0.77 mg/dL      Sodium 141 mmol/L      Potassium 4.0 mmol/L      Comment: Specimen hemolyzed.  Results may be affected.        Chloride 100 mmol/L      CO2 15.0 mmol/L      Calcium 9.3 mg/dL      eGFR  African Amer 88 mL/min/1.73      BUN/Creatinine Ratio 14.3     Anion Gap 26.0 mmol/L     Narrative:       GFR Normal >60  Chronic Kidney Disease <60  Kidney Failure <15    Lipid Panel [[id number]]  (Abnormal) Collected:  11/05/19 0600    Specimen:  Blood Updated:  11/05/19 1218     Total Cholesterol 168 mg/dL      Triglycerides 105 mg/dL      HDL Cholesterol 36 mg/dL      LDL Cholesterol  111 mg/dL      VLDL Cholesterol 21 mg/dL      LDL/HDL Ratio 3.08    Narrative:       Cholesterol Reference Ranges  (U.S. Department of Health and Human Services ATP III Classifications)    Desirable          <200 mg/dL  Borderline High    200-239 mg/dL  High Risk          >240 mg/dL      Triglyceride Reference Ranges  (U.S. Department of Health and Human Services ATP III Classifications)    Normal           <150 mg/dL  Borderline High  150-199 mg/dL  High             200-499 mg/dL  Very High        >500 mg/dL    HDL Reference Ranges  (U.S. Department of Health and Human Services ATP III Classifcations)    Low     <40 mg/dl (major risk factor for CHD)  High    >60 mg/dl ('negative' risk factor for CHD)        LDL Reference Ranges  (U.S.  Department of Health and Human Services ATP III Classifcations)    Optimal          <100 mg/dL  Near Optimal     100-129 mg/dL  Borderline High  130-159 mg/dL  High             160-189 mg/dL  Very High        >189 mg/dL    CBC (No Diff) [[id number]]  (Abnormal) Collected:  11/05/19 0642    Specimen:  Blood Updated:  11/05/19 0709     WBC 6.18 10*3/mm3      RBC 6.22 10*6/mm3      Hemoglobin 16.0 g/dL      Hematocrit 44.4 %      MCV 71.4 fL      MCH 25.7 pg      MCHC 36.0 g/dL      RDW 15.3 %      RDW-SD 37.1 fl      MPV 9.7 fL      Platelets 174 10*3/mm3     POC Glucose Once [[id number]]  (Normal) Collected:  11/05/19 1132    Specimen:  Blood Updated:  11/05/19 1143     Glucose 103 mg/dL      Comment: : [id number] PAVEL CORONANMeter ID: [id number]              A/P  1.  Non-STEMI she has a borderline circumflex lesion which is going to be treated medically.  We will add Plavix as she is intolerant to ibuprofen.  Also will add ice with sore body.    2.  Hypoglycemia-this is kind unexplained and she had an episode today when she became hypoglycemic and became bradycardic associated with this.  Causes of hypoglycemia needs to be addressed by the primary care team.    3.  Bradycardia-this is secondary to #2.  This may have been a vasovagal reaction.  Primary hospital team will work-up for hypoglycemia.        This document has been electronically signed by Arleth Rosenberg MD on November 5, 2019 1:10 PM         Part of this note may be an electronic transcription/translation of spoken language to printed text using the Dragon Dictation System.  Overall doing well.  She had a.  This morning where she became bradycardic this was associated with hypoglycemia.

## 2019-11-05 NOTE — PROGRESS NOTES
[facility] Medicine Services  INPATIENT PROGRESS NOTE    Length of Stay: 2  Date of Admission: 11/3/2019  Primary Care Physician: Dominick Sandy MD    Subjective   Chief Complaint: Chest pain  HPI: No complaints this morning.  She states that she did have an episode where she felt poorly earlier.  She states this happens to her sometimes at home.  Review of Systems   Constitutional: Positive for fatigue. Negative for appetite change, chills, fever and unexpected weight change.   Respiratory: Negative for cough, choking, chest tightness, shortness of breath and wheezing.    Cardiovascular: Positive for chest pain. Negative for palpitations and leg swelling.   Gastrointestinal: Negative for abdominal pain, blood in stool, constipation, diarrhea, nausea and vomiting.   Genitourinary: Negative for dysuria, flank pain and hematuria.   Neurological: Negative for dizziness, seizures, syncope, speech difficulty, weakness, light-headedness, numbness and headaches.   Hematological: Does not bruise/bleed easily.        All pertinent negatives and positives are as above. All other systems have been reviewed and are negative unless otherwise stated.     Objective    Temp:  [97.4 °F (36.3 °C)-97.8 °F (36.6 °C)] 97.5 °F (36.4 °C)  Heart Rate:  [67-79] 67  Resp:  [16-20] 20  BP: (107-166)/() 107/66    Physical Exam   Constitutional: She appears well-developed and well-nourished.   HENT:   Head: Normocephalic and atraumatic.   Eyes: EOM are normal. Pupils are equal, round, and reactive to light.   Neck: Normal range of motion. Neck supple.   Cardiovascular: Normal rate, regular rhythm and normal heart sounds. Exam reveals no gallop and no friction rub.   No murmur heard.  Pulmonary/Chest: Effort normal and breath sounds normal. No respiratory distress. She has no wheezes. She has no rales. She exhibits no tenderness.   Abdominal: Soft. Bowel sounds are normal. She exhibits no  distension. There is no tenderness. There is no guarding.   Musculoskeletal: She exhibits no edema.   Skin: Skin is warm and dry.   Psychiatric: She has a normal mood and affect. Her behavior is normal. Thought content normal.   Vitals reviewed.      Results Review:  I have reviewed the labs, radiology results, and diagnostic studies.    Laboratory Data:   Results from last 7 days   Lab Units 11/05/19  0600 11/04/19  0609   SODIUM mmol/L 141 138   POTASSIUM mmol/L 4.0 3.9   CHLORIDE mmol/L 100 103   CO2 mmol/L 15.0* 19.0*   BUN mg/dL 11 13   CREATININE mg/dL 0.77 0.77   GLUCOSE mg/dL 32* 53*   CALCIUM mg/dL 9.3 9.6   BILIRUBIN mg/dL  --  0.7   ALK PHOS U/L  --  113   ALT (SGPT) U/L  --  15   AST (SGOT) U/L  --  36*   ANION GAP mmol/L 26.0* 16.0*     Estimated Creatinine Clearance: 47.4 mL/min (by C-G formula based on SCr of 0.77 mg/dL).  Results from last 7 days   Lab Units 11/04/19  0609   MAGNESIUM mg/dL 1.7   PHOSPHORUS mg/dL 3.6         Results from last 7 days   Lab Units 11/05/19  0642 11/04/19  0559 11/03/19  2343   WBC 10*3/mm3 6.18 5.08 6.05   HEMOGLOBIN g/dL 16.0* 15.1 16.6*   HEMATOCRIT % 44.4 42.2 47.5*   PLATELETS 10*3/mm3 174 185 174           Culture Data:   No results found for: BLOODCX  No results found for: URINECX  No results found for: RESPCX  No results found for: WOUNDCX  No results found for: STOOLCX  No components found for: BODYFLD    Radiology Data:   Imaging Results (Last 24 Hours)     Procedure Component Value Units Date/Time    CT Angiogram Chest With & Without Contrast [[id number]] Collected:  11/04/19 1403     Updated:  11/04/19 1526    Narrative:       EXAM: CT ANGIOGRAM CHEST W WO CONTRAST    DATE: 11/4/2019 2:03 PM CST    TECHNIQUE: Axial images were obtained at 2 mm intervals from the  level of thoracic inlet through the upper abdomen during bolus  infusion of IV contrast.      This exam was performed according to our departmental  dose-optimization program, which includes automated  exposure  control, adjustment of the mA and/or kV according to patient size  and/or use of iterative reconstruction technique.    CLINICAL INDICATION: Chest pain, acute, PE suspected, high  pretest prob, I21.4 Non-ST elevation (NSTEMI) myocardial  infarction    COMPARISON: None available.    FINDINGS: There is a 1.6 cm noncalcified pulmonary nodule in the  superior segment of the right lower lobe. The findings are  concerning for primary lung neoplasm. Further evaluation with PET  scan and/or FNA is recommended.    The pulmonary arteries are well-opacified. There are no central  filling defects at L5. There is a subtle serpiginous/linear  filling defect noted within a subsegmental branch of the left  upper lobe best seen on images 62 and 63 of series 14 and image  60 and 61 of series 11. These findings are concerning for a small  subsegmental pulmonary was in the left upper lobe.    No focal infiltrates or evidence of pulmonary infarction is seen.  There is no definitive enlarged mediastinal or hilar adenopathy..      Impression:       1. 1.6 cm noncalcified well-defined pulmonary nodule in the  superior segment of the right lower lobe. Neoplasm cannot be  excluded and follow-up is recommended with a PET scan and/or FNA  for diagnosis and staging.  2. Suspected small subsegmental embolus in the left upper lobe.    2017 Fleischner Society Recommendations for Single Solid Lung  Nodule Follow-Up based on size (average of long- and short-axis  diameters)    <6 mm Low-Risk Patient: No routine follow-up   <6 mm High-Risk Patient: Optional CT at 12 months    6-8 mm Low-Risk Patient: CT at 6-12 months then consider CT at  18-24 months  6-8 mm High-Risk Patient: CT at 6-12 months then CT at 18-24  months    >8 mm Low-Risk Patient: Consider CT, PET/CT or tissue sampling at  3 months  >8 mm High-Risk Patient: Same as for low-risk patient     Electronically signed by:  Angely Saenz MD  11/4/2019 3:25 PM  CST Workstation:  [id number]          I have reviewed the patient's current medications.     Assessment/Plan     Active Hospital Problems    Diagnosis   • NSTEMI (non-ST elevated myocardial infarction) (CMS/[facility])       Plan:    1.  Non-ST elevation myocardial infarction: Seen by cardiology.  Left heart cath showed 50% mid circumflex disease.  2.  Diabetes mellitus: Continue current treatment.  3.  Probable small subsegmental embolus in the left upper lobe: On anticoagulation.  4.  Episode of bradycardia: Spontaneously resolved.  We will continue to monitor.  Cardiology following.  5.  GERD: Continue current treatment.  6.  1.6 cm noncalcified pulmonary nodule in the right lower lobe: Will need follow-up.    Discharge Planning: I expect patient to be discharged to home in 2-3 days.        This document has been electronically signed by Rigo Alford MD on November 5, 2019 12:42 PM

## 2019-11-05 NOTE — PLAN OF CARE
Problem: Skin Injury Risk (Adult)  Goal: Identify Related Risk Factors and Signs and Symptoms  Outcome: Ongoing (interventions implemented as appropriate)    Goal: Skin Health and Integrity  Outcome: Ongoing (interventions implemented as appropriate)      Problem: Patient Care Overview  Goal: Plan of Care Review   11/04/19 2044   Coping/Psychosocial   Plan of Care Reviewed With caregiver   Plan of Care Review   Progress no change   OTHER   Outcome Summary Clear heart cath today. CTA needs follow-up.        Problem: Cardiac: ACS (Acute Coronary Syndrome) (Adult)  Goal: Signs and Symptoms of Listed Potential Problems Will be Absent, Minimized or Managed (Cardiac: ACS)  Outcome: Ongoing (interventions implemented as appropriate)

## 2019-11-05 NOTE — PLAN OF CARE
Problem: Skin Injury Risk (Adult)  Goal: Identify Related Risk Factors and Signs and Symptoms  Outcome: Ongoing (interventions implemented as appropriate)    Goal: Skin Health and Integrity  Outcome: Ongoing (interventions implemented as appropriate)      Problem: Patient Care Overview  Goal: Plan of Care Review  Outcome: Ongoing (interventions implemented as appropriate)   11/05/19 8966   OTHER   Outcome Summary Patient maintained VSS through shift on NV 2L with no complaints of pain of discomfort. Perclose did leak sanguinous drainage upon ambulation last night, pressure was held for 10 minutes, site was redressed, bleeding has since been absent. Site soft and free of blood. Distal pulses well palpated. Continuing to monitor patient.        Problem: Cardiac: ACS (Acute Coronary Syndrome) (Adult)  Goal: Signs and Symptoms of Listed Potential Problems Will be Absent, Minimized or Managed (Cardiac: ACS)  Outcome: Ongoing (interventions implemented as appropriate)

## 2019-11-06 NOTE — PLAN OF CARE
Problem: Skin Injury Risk (Adult)  Intervention: Promote/Optimize Nutrition   11/06/19 1434   Nutrition Interventions   Oral Nutrition Promotion calorie dense foods provided;calorie dense liquids provided;nutritional therapy counseling provided         Problem: Patient Care Overview  Goal: Plan of Care Review  Outcome: Ongoing (interventions implemented as appropriate)   11/06/19 1434   Coping/Psychosocial   Plan of Care Reviewed With caregiver;patient   Plan of Care Review   Progress improving   OTHER   Outcome Summary Pt has been started on a po diet but it appears that her overall is suboptimal. She has not tried the boost and she is encouraged to try it. Will continue to monitor

## 2019-11-07 NOTE — PROGRESS NOTES
Malnutrition Severity Assessment    Patient Name:  Nancy Shepherd  YOB: 1940  MRN: [id number]  Admit Date:  11/3/2019    Patient meets criteria for : Severe Malnutrition    Comments:  Pt presents at severe malnutrition as evidenced by her hx of wt loss over the past 2 years of ~40% of her UBW (based on her report).  Pt with decreased appetite and oral intake for some time.  She has physical signs of malnutrition present with fast loss and muscle wasting.  Please see details below.      Malnutrition Severity Assessment  Malnutrition Type: Chronic Disease - Related Malnutrition     Malnutrition Type (last 8 hours)      Malnutrition Severity Assessment     Row Name 11/07/19 1428       Malnutrition Severity Assessment    Malnutrition Type  Chronic Disease - Related Malnutrition    Row Name 11/07/19 1428       Insufficient Energy Intake     Insufficient Energy Intake   <75% of est. energy requirement for > or equal to 1 month    Row Name 11/07/19 1428       Unintentional Weight Loss     Unintentional Weight Loss   Weight loss greater than 20% in one year    Row Name 11/07/19 1428       Muscle Loss    Loss of Muscle Mass Findings  Severe    Evangelical Region  Severe - deep hollowing/scooping, lack of muscle to touch, facial bones well defined    Clavicle Bone Region  Severe - protruding prominent bone    Acromion Bone Region  Severe - squared shoulders, bones, and acromion process protrusion prominent    Scapular Bone Region  Severe - prominent bones, depressions easily visible between ribs, scapula, spine, shoulders    Row Name 11/07/19 1428       Fat Loss    Subcutaneous Fat Loss Findings  Severe    Orbital Region   Severe - pronounced hollowness/depression, dark circles, loose saggy skin    Upper Arm Region  Severe - mostly skin, very little space between folds, fingers touch    Row Name 11/07/19 1428       Criteria Met (Must meet criteria for severity in at least 2 of these categories: M Wasting,  Fat Loss, Fluid, Secondary Signs, Wt. Status, Intake)    Patient meets criteria for   Severe Malnutrition          Electronically signed by:  Martha Chance RD  11/07/19 2:35 PM

## 2019-11-07 NOTE — PLAN OF CARE
Problem: Skin Injury Risk (Adult)  Goal: Identify Related Risk Factors and Signs and Symptoms  Outcome: Outcome(s) achieved Date Met: 11/07/19    Goal: Skin Health and Integrity  Outcome: Ongoing (interventions implemented as appropriate)      Problem: Patient Care Overview  Goal: Plan of Care Review  Outcome: Ongoing (interventions implemented as appropriate)   11/07/19 0427   Coping/Psychosocial   Plan of Care Reviewed With patient   Plan of Care Review   Progress improving   OTHER   Outcome Summary Systolic BP better this night. Fluids at 75. Will continue to monitor.        Problem: Cardiac: ACS (Acute Coronary Syndrome) (Adult)  Goal: Signs and Symptoms of Listed Potential Problems Will be Absent, Minimized or Managed (Cardiac: ACS)  Outcome: Ongoing (interventions implemented as appropriate)      Problem: Fall Risk (Adult)  Goal: Identify Related Risk Factors and Signs and Symptoms  Outcome: Outcome(s) achieved Date Met: 11/07/19    Goal: Absence of Fall  Outcome: Ongoing (interventions implemented as appropriate)

## 2019-11-07 NOTE — PROGRESS NOTES
"    [facility] Medicine Services  INPATIENT PROGRESS NOTE    Length of Stay: 3  Date of Admission: 11/3/2019  Primary Care Physician: Dominick Sandy MD    Subjective   Chief Complaint: chest pain  HPI:  \"The patient is a 78-year old  woman who was transferred to this facility from [facility] with a history of chest pain.  According to her, she has had recurrent episodes of chest pain for at least 1 month or more.  She decided to go to [facility] yesterday because her symptoms seem to have gotten worse.  She has shortness of breath and a cough which is productive for whitish sputum.  She has no fever or chills, no palpitations, headache, PND, orthopnea.  She did not characterize her pain as such.  Pain is not effort related.  Initial troponin obtained at the referring hospital was said to be slightly elevated and was reported as 0.160.  Subsequent values was 0.182 and 0.177.     The patient was completely pain-free when I saw her.\"    BP low today.  Denies any symptoms.  Sugars better.      Review of Systems   Respiratory: Negative for shortness of breath.    Cardiovascular: Negative for chest pain.   Gastrointestinal: Negative for abdominal pain.        All pertinent negatives and positives are as above. All other systems have been reviewed and are negative unless otherwise stated.     Objective    Temp:  [97 °F (36.1 °C)-97.8 °F (36.6 °C)] 97.3 °F (36.3 °C)  Heart Rate:  [69-83] 77  Resp:  [18] 18  BP: ()/(46-62) 101/62    Physical Exam   Constitutional: She appears well-developed.   HENT:   Head: Normocephalic and atraumatic.   Eyes: Pupils are equal, round, and reactive to light.   Neck: Normal range of motion.   Cardiovascular: Normal rate.   Pulmonary/Chest: She has decreased breath sounds. She has no wheezes.   Abdominal: Soft. There is no tenderness.   Musculoskeletal: She exhibits no edema.   Neurological: She is alert.   Skin: Skin is warm and " dry.   Psychiatric: She has a normal mood and affect.           Results Review:  I have reviewed the labs, radiology results, and diagnostic studies.    Laboratory Data:   Results from last 7 days   Lab Units 11/05/19  0600 11/04/19  0609   SODIUM mmol/L 141 138   POTASSIUM mmol/L 4.0 3.9   CHLORIDE mmol/L 100 103   CO2 mmol/L 15.0* 19.0*   BUN mg/dL 11 13   CREATININE mg/dL 0.77 0.77   GLUCOSE mg/dL 32* 53*   CALCIUM mg/dL 9.3 9.6   BILIRUBIN mg/dL  --  0.7   ALK PHOS U/L  --  113   ALT (SGPT) U/L  --  15   AST (SGOT) U/L  --  36*   ANION GAP mmol/L 26.0* 16.0*     Estimated Creatinine Clearance: 46.9 mL/min (by C-G formula based on SCr of 0.77 mg/dL).  Results from last 7 days   Lab Units 11/04/19  0609   MAGNESIUM mg/dL 1.7   PHOSPHORUS mg/dL 3.6         Results from last 7 days   Lab Units 11/05/19  0642 11/04/19  0559 11/03/19  2343   WBC 10*3/mm3 6.18 5.08 6.05   HEMOGLOBIN g/dL 16.0* 15.1 16.6*   HEMATOCRIT % 44.4 42.2 47.5*   PLATELETS 10*3/mm3 174 185 174           Culture Data:   No results found for: BLOODCX  No results found for: URINECX  No results found for: RESPCX  No results found for: WOUNDCX  No results found for: STOOLCX  No components found for: BODYFLD    Radiology Data:   Imaging Results (Last 24 Hours)     ** No results found for the last 24 hours. **          I have reviewed the patient's current medications.     Assessment/Plan     Active Hospital Problems    Diagnosis   • NSTEMI (non-ST elevated myocardial infarction) (CMS/[facility])       Plan:    1.  Non-ST elevation myocardial infarction: Seen by cardiology.  Left heart cath showed 50% mid circumflex disease.  2.  Diabetes mellitus: not on any meds at home.  SSI for now.  3.  Probable small subsegmental embolus in the left upper lobe: On anticoagulation.  4.  Episode of bradycardia: Resolved.  We will continue to monitor.  Cardiology following.  5.  GERD: Continue current treatment.  6.  1.6 cm noncalcified pulmonary nodule in the right lower  lobe: Will need follow-up.  7.  Hypotension:new, improved.  hold all BP meds.  IVF.  No signs of infection.  Monitor     Signed     Dr Roscoe Gramajo,    11/6/2019  6:55 PM

## 2019-11-07 NOTE — PLAN OF CARE
Problem: Patient Care Overview  Goal: Plan of Care Review  Outcome: Ongoing (interventions implemented as appropriate)   11/07/19 1000   Coping/Psychosocial   Plan of Care Reviewed With patient   OTHER   Outcome Summary Initial PT evaluation complete. Patient is alert and cooperative. She requires SPV with transfers, CGA with gait, ambulating 25'x1 with FWW, stooped posture, slow jessie. Attempted to ambulate without supplemental O2 (per nurse) but SpO2 dropped to 88% while seated even before starting ambulation. Patient would benefit from HHPT upon discharge home; she lives alone but is check on 3x/day by her cousin. Goals established, continue skilled PT.

## 2019-11-07 NOTE — CONSULTS
Adult Nutrition  Assessment    Patient Name:  Nancy Shepherd  YOB: 1940  MRN: [id number]  Admit Date:  11/3/2019    Assessment Date:  11/7/2019    Comments:  Limited po intake documentation but it appears that pt is eating poorly.  She hasn't tried the supplements but tells me that she will.  Cardiology following s/p Heart Cath.  She is noted to have a probable small subsegmental embolus of the left upper lobe along with a 1.6 cm pulmonary nodule in the right lower lobe.  Her bradycardia has resolved.  Her wt is down further with pt receiving lasix.  I suspect that her actual wt loss has been masked by her fluid status.  Encouraged po and intake of supplements.                             Electronically signed by:  Martha Chance RD  11/07/19 2:24 PM

## 2019-11-07 NOTE — PLAN OF CARE
Problem: Skin Injury Risk (Adult)  Goal: Skin Health and Integrity  Outcome: Ongoing (interventions implemented as appropriate)      Problem: Patient Care Overview  Goal: Plan of Care Review  Outcome: Ongoing (interventions implemented as appropriate)   11/07/19 4753   Coping/Psychosocial   Plan of Care Reviewed With patient   Plan of Care Review   Progress no change   OTHER   Outcome Summary patient denies chest pain but is dizzy upon standing at times. So we are going to try to help patient by ambulating her in the huerta to see if being in bed for several days has caused this        Problem: Cardiac: ACS (Acute Coronary Syndrome) (Adult)  Goal: Signs and Symptoms of Listed Potential Problems Will be Absent, Minimized or Managed (Cardiac: ACS)  Outcome: Ongoing (interventions implemented as appropriate)      Problem: Fall Risk (Adult)  Goal: Absence of Fall  Outcome: Ongoing (interventions implemented as appropriate)

## 2019-11-07 NOTE — PROGRESS NOTES
"    [facility] Medicine Services  INPATIENT PROGRESS NOTE    Length of Stay: 4  Date of Admission: 11/3/2019  Primary Care Physician: Dominick Sandy MD    Subjective   Chief Complaint: Chest pain  HPI:  \"The patient is a 78-year old  woman who was transferred to this facility from [facility] with a history of chest pain.  According to her, she has had recurrent episodes of chest pain for at least 1 month or more.  She decided to go to [facility] yesterday because her symptoms seem to have gotten worse.  She has shortness of breath and a cough which is productive for whitish sputum.  She has no fever or chills, no palpitations, headache, PND, orthopnea.  She did not characterize her pain as such.  Pain is not effort related.  Initial troponin obtained at the referring hospital was said to be slightly elevated and was reported as 0.160.  Subsequent values was 0.182 and 0.177.     The patient was completely pain-free when I saw her.\"    Patient continues to have borderline blood pressure and occasional low blood sugars.  Likely secondary to decreased p.o. intake.  We will ask the dietary for recommendations of low blood sugars.    Review of Systems   Respiratory: Negative for shortness of breath.    Cardiovascular: Negative for chest pain.   Gastrointestinal: Negative for abdominal pain.        All pertinent negatives and positives are as above. All other systems have been reviewed and are negative unless otherwise stated.     Objective    Temp:  [97.1 °F (36.2 °C)-97.9 °F (36.6 °C)] 97.1 °F (36.2 °C)  Heart Rate:  [71-85] 73  Resp:  [17-18] 17  BP: ()/(48-63) 100/63    Physical Exam   Constitutional: She appears cachectic.   HENT:   Head: Normocephalic and atraumatic.   Eyes: Pupils are equal, round, and reactive to light.   Neck: Normal range of motion.   Cardiovascular: Normal rate.   Pulmonary/Chest: She has decreased breath sounds. She has no wheezes. "   Abdominal: Soft. There is no tenderness.   Musculoskeletal: She exhibits no edema.   Neurological: She is alert.   Skin: Skin is warm and dry.   Psychiatric: She has a normal mood and affect.           Results Review:  I have reviewed the labs, radiology results, and diagnostic studies.    Laboratory Data:   Results from last 7 days   Lab Units 11/07/19  0606 11/05/19  0600 11/04/19  0609   SODIUM mmol/L 137 141 138   POTASSIUM mmol/L 2.9* 4.0 3.9   CHLORIDE mmol/L 100 100 103   CO2 mmol/L 23.0 15.0* 19.0*   BUN mg/dL 13 11 13   CREATININE mg/dL 0.72 0.77 0.77   GLUCOSE mg/dL 60* 32* 53*   CALCIUM mg/dL 8.7 9.3 9.6   BILIRUBIN mg/dL  --   --  0.7   ALK PHOS U/L  --   --  113   ALT (SGPT) U/L  --   --  15   AST (SGOT) U/L  --   --  36*   ANION GAP mmol/L 14.0 26.0* 16.0*     Estimated Creatinine Clearance: 44.5 mL/min (by C-G formula based on SCr of 0.72 mg/dL).  Results from last 7 days   Lab Units 11/04/19  0609   MAGNESIUM mg/dL 1.7   PHOSPHORUS mg/dL 3.6         Results from last 7 days   Lab Units 11/07/19  0606 11/06/19  1903 11/05/19  0642 11/04/19  0559 11/03/19  2343   WBC 10*3/mm3 4.10 4.73 6.18 5.08 6.05   HEMOGLOBIN g/dL 13.4 14.5 16.0* 15.1 16.6*   HEMATOCRIT % 36.8 39.8 44.4 42.2 47.5*   PLATELETS 10*3/mm3 167 168 174 185 174           Culture Data:   No results found for: BLOODCX  No results found for: URINECX  No results found for: RESPCX  No results found for: WOUNDCX  No results found for: STOOLCX  No components found for: BODYFLD    Radiology Data:   Imaging Results (Last 24 Hours)     ** No results found for the last 24 hours. **          I have reviewed the patient's current medications.     Assessment/Plan     Active Hospital Problems    Diagnosis   • NSTEMI (non-ST elevated myocardial infarction) (CMS/[facility])       Plan:    1.  Non-ST elevation myocardial infarction: Seen by cardiology.  Left heart cath showed 50% mid circumflex disease.  2.  Diabetes mellitus:  Now with intermittent episodes of  hypoglycemia.  Likely secondary to decreased p.o. intake.  Will ask dietary for recommendations of low blood sugars.  Not on any meds at home.  SSI for now.  3.  Probable small subsegmental embolus in the left upper lobe: On anticoagulation.  4.  Episode of bradycardia: Resolved.  We will continue to monitor.  Cardiology following.  5.  GERD: Continue current treatment.  6.  1.6 cm noncalcified pulmonary nodule in the right lower lobe: Will need follow-up.  7.  Hypotension:new, improved.  hold all BP meds.  IVF.  No signs of infection.  Monitor     Signed     Dr Roscoe Gramajo,    11/7/2019  5:50 PM

## 2019-11-07 NOTE — THERAPY EVALUATION
Acute Care - Physical Therapy Initial Evaluation  [facility]     Patient Name: Nancy Shepherd  : 1940  MRN: [id number]  Today's Date: 2019   Onset of Illness/Injury or Date of Surgery: 19  Date of Referral to PT: 19  Referring Physician: BENIGNO Gramajo DO.      Admit Date: 11/3/2019    Visit Dx:     ICD-10-CM ICD-9-CM   1. NSTEMI (non-ST elevated myocardial infarction) (CMS/HCC) I21.4 410.70   2. Impaired physical mobility Z74.09 781.99     Patient Active Problem List   Diagnosis   • NSTEMI (non-ST elevated myocardial infarction) (CMS/HCC)     Past Medical History:   Diagnosis Date   • Diabetes mellitus (CMS/HCC)    • Hypertension      Past Surgical History:   Procedure Laterality Date   • CARDIAC CATHETERIZATION Right 2019    Procedure: Left Heart Cath;  Surgeon: Arleth Rosenberg MD;  Location: [facility] CATH INVASIVE LOCATION;  Service: Cardiology        PT ASSESSMENT (last 12 hours)      Physical Therapy Evaluation     Row Name 19 1000          PT Evaluation Time/Intention    Subjective Information  no complaints  -CZ     Document Type  evaluation  -CZ     Mode of Treatment  individual therapy;physical therapy  -CZ     Patient Effort  good  -CZ     Symptoms Noted During/After Treatment  fatigue  -CZ     Row Name 19 1000          General Information    Patient Profile Reviewed?  yes  -CZ     Onset of Illness/Injury or Date of Surgery  19  -CZ     Referring Physician  BENIGNO Gramajo DO.  -CZ     Patient Observations  alert;cooperative;agree to therapy  -CZ     Patient/Family Observations  No family present.   -CZ     General Observations of Patient  Sitting in recliner, O2, IV, telemetry, no apparent distress.   -CZ     Prior Level of Function  independent:;all household mobility  -CZ     Equipment Currently Used at Home  none  -CZ     Equipment Issued to Patient  gait belt  -CZ     Benefits Reviewed  patient:;improve function;increase  independence;increase strength;increase balance  -CZ     Row Name 11/07/19 1000          Relationship/Environment    Lives With  alone  -CZ     Concerns About Impact on Relationships  Cousin checks on patient 3x/day.   -CZ     Row Name 11/07/19 1000          Living Environment    Living Arrangements  house  -CZ     Home Accessibility  stairs to enter home  -CZ     Row Name 11/07/19 1000          Home Main Entrance    Number of Stairs, Main Entrance  one  -CZ     Stair Railings, Main Entrance  none  -CZ     Row Name 11/07/19 1000          Cognitive Assessment/Intervention- PT/OT    Orientation Status (Cognition)  oriented x 4  -CZ     Follows Commands (Cognition)  WFL  -CZ     Row Name 11/07/19 1000          Transfer Assessment/Treatment    Transfer Assessment/Treatment  sit-stand transfer  -CZ     Sit-Stand [place] (Transfers)  supervision  -CZ     Row Name 11/07/19 1000          Gait/Stairs Assessment/Training    [place] Level (Gait)  contact guard  -CZ     Assistive Device (Gait)  walker, front-wheeled  -CZ     Distance in Feet (Gait)  25'x1  -CZ     Comment (Gait/Stairs)  Stooped posture, slow jessie.   -CZ     Row Name 11/07/19 1000          General ROM    GENERAL ROM COMMENTS  BLEs WFL  -CZ     Row Name 11/07/19 1000          MMT (Manual Muscle Testing)    General MMT Comments  BLEs 3+/5 grossly  -CZ     Row Name 11/07/19 1000          Sensory Assessment/Intervention    Sensory General Assessment  no sensation deficits identified  -CZ     Row Name 11/07/19 1000          Vision Assessment/Intervention    Visual Impairment/Limitations  corrective lenses full time  -CZ     Row Name 11/07/19 1000          Physical Therapy Clinical Impression    Date of Referral to PT  11/06/19  -CZ     PT Diagnosis (PT Clinical Impression)  impaired physical mobility  -CZ     Prognosis (PT Clinical Impression)  good  -CZ     Criteria for Skilled Interventions Met (PT Clinical Impression)  yes;treatment indicated  -CZ      Pathology/Pathophysiology Noted (Describe Specifically for Each System)  musculoskeletal;cardiovascular;pulmonary  -CZ     Impairments Found (describe specific impairments)  aerobic capacity/endurance;gait, locomotion, and balance;ventilation and respiration/gas exchange  -CZ     Rehab Potential (PT Clinical Summary)  good, to achieve stated therapy goals  -CZ     Predicted Duration of Therapy (PT)  2-4 days  -CZ     Row Name 11/07/19 1000          Vital Signs    Pre Systolic BP Rehab  96  -CZ     Pre Treatment Diastolic BP  58  -CZ     Post Systolic BP Rehab  97  -CZ     Post Treatment Diastolic BP  59  -CZ     Pretreatment Heart Rate (beats/min)  73  -CZ     Intratreatment Heart Rate (beats/min)  79  -CZ     Pre SpO2 (%)  96  -CZ     O2 Delivery Pre Treatment  nasal cannula 2.5 LPM  -CZ     Intra SpO2 (%)  88  -CZ     O2 Delivery Intra Treatment  room air  -CZ     Post SpO2 (%)  97  -CZ     O2 Delivery Post Treatment  nasal cannula  -CZ     Pre Patient Position  Sitting  -CZ     Intra Patient Position  Sitting  -CZ     Post Patient Position  Sitting  -CZ     Row Name 11/07/19 1000          Physical Therapy Goals    Bed Mobility Goal Selection (PT)  bed mobility, PT goal 1  -CZ     Transfer Goal Selection (PT)  transfer, PT goal 1  -CZ     Gait Training Goal Selection (PT)  gait training, PT goal 1  -CZ     Stairs Goal Selection (PT)  stairs, PT goal 1  -CZ     Additional Documentation  Stairs Goal Selection (PT) (Row)  -     Row Name 11/07/19 1000          Bed Mobility Goal 1 (PT)    Activity/Assistive Device (Bed Mobility Goal 1, PT)  sit to supine/supine to sit  -     [place] Level/Cues Needed (Bed Mobility Goal 1, PT)  conditional independence  -CZ     Time Frame (Bed Mobility Goal 1, PT)  long term goal (LTG);by discharge  -CZ     Progress/Outcomes (Bed Mobility Goal 1, PT)  goal not met  -     Row Name 11/07/19 1000          Transfer Goal 1 (PT)    Activity/Assistive Device (Transfer Goal 1, PT)   sit-to-stand/stand-to-sit;bed-to-chair/chair-to-bed  -CZ     [place] Level/Cues Needed (Transfer Goal 1, PT)  conditional independence  -CZ     Time Frame (Transfer Goal 1, PT)  long term goal (LTG);by discharge  -CZ     Progress/Outcome (Transfer Goal 1, PT)  goal not met  -CZ     Row Name 11/07/19 1000          Gait Training Goal 1 (PT)    Activity/Assistive Device (Gait Training Goal 1, PT)  walker, rolling  -CZ     [place] Level (Gait Training Goal 1, PT)  conditional independence  -CZ     Distance (Gait Goal 1, PT)  150'x2  -CZ     Time Frame (Gait Training Goal 1, PT)  long term goal (LTG);by discharge  -CZ     Barriers (Gait Training Goal 1, PT)  Maintain SpO2 >90%  -CZ     Progress/Outcome (Gait Training Goal 1, PT)  goal not met  -CZ     Row Name 11/07/19 1000          Stairs Goal 1 (PT)    Activity/Assistive Device (Stairs Goal 1, PT)  walker, rolling  -CZ     [place] Level/Cues Needed (Stairs Goal 1, PT)  supervision required  -CZ     Number of Stairs (Stairs Goal 1, PT)  1 step, no railing.   -CZ     Time Frame (Stairs Goal 1, PT)  long term goal (LTG);by discharge  -CZ     Barriers (Stairs Goal 1, PT)  Maintain SpO2 >90%  -CZ     Progress/Outcome (Stairs Goal 1, PT)  goal not met  -CZ     Row Name 11/07/19 1000          Positioning and Restraints    Pre-Treatment Position  sitting in chair/recliner  -CZ     Post Treatment Position  chair  -CZ     In Chair  reclined;call light within reach;encouraged to call for assist;with other staff;legs elevated  -CZ       User Key  (r) = Recorded By, (t) = Taken By, (c) = Cosigned By    Initials Name Provider Type    CZ Jerome Garza, PT Physical Therapist        Physical Therapy Education     Title: PT OT SLP Therapies (In Progress)     Topic: Physical Therapy (In Progress)     Point: Mobility training (Done)     Learning Progress Summary           Patient Acceptance, EERAN,NR by MAHNAZ at 11/7/2019  4:08 PM    Comment:  Educated on proper hand  placement with transfers, proper use of walker, benefits of upright posture with gait.                               User Key     Initials Effective Dates Name Provider Type Discipline    CZ 04/03/18 -  Jerome Garza, PT Physical Therapist PT              PT Recommendation and Plan  Anticipated Discharge Disposition (PT): home with home health  Planned Therapy Interventions (PT Eval): balance training, bed mobility training, gait training, home exercise program, patient/family education, stair training, strengthening, stretching, transfer training  Therapy Frequency (PT Clinical Impression): other (see comments)(6x/week)  Outcome Summary/Treatment Plan (PT)  Anticipated Discharge Disposition (PT): home with home health  Plan of Care Reviewed With: patient  Outcome Summary: Initial PT evaluation complete. Patient is alert and cooperative.  She requires SPV with transfers, CGA with gait, ambulating 25'x1 with FWW, stooped posture, slow jessie.  Attempted to ambulate without supplemental O2 (per nurse) but SpO2 dropped to 88% while seated even before starting ambulation.  Patient would benefit from HHPT upon discharge home; she lives alone but is check on 3x/day by her cousin.  Goals established, continue skilled PT.   Outcome Measures     Row Name 11/07/19 1000             How much help from another person do you currently need...    Turning from your back to your side while in flat bed without using bedrails?  3  -CZ      Moving from lying on back to sitting on the side of a flat bed without bedrails?  3  -CZ      Moving to and from a bed to a chair (including a wheelchair)?  3  -CZ      Standing up from a chair using your arms (e.g., wheelchair, bedside chair)?  3  -CZ      Climbing 3-5 steps with a railing?  3  -CZ      To walk in hospital room?  3  -CZ      AM-PAC 6 Clicks Score (PT)  18  -CZ         Functional Assessment    Outcome Measure Options  AM-PAC 6 Clicks Basic Mobility (PT)  -CZ        User Key  (r)  = Recorded By, (t) = Taken By, (c) = Cosigned By    Initials Name Provider Type    CZ Jerome Garza, PT Physical Therapist         Time Calculation:   PT Charges     Row Name 11/07/19 1611             Time Calculation    Start Time  1000  -CZ      Stop Time  1033  -CZ      Time Calculation (min)  33 min  -CZ      PT Received On  11/07/19  -CZ      PT Goal Re-Cert Due Date  11/20/19  -CZ        User Key  (r) = Recorded By, (t) = Taken By, (c) = Cosigned By    Initials Name Provider Type    Jerome Coles, PT Physical Therapist        Therapy Charges for Today     Code Description Service Date Service Provider Modifiers Qty    [id number] HC PT EVAL MOD COMPLEXITY 2 11/7/2019 Jerome Garza, PT GP 1          PT G-Codes  Outcome Measure Options: AM-PAC 6 Clicks Basic Mobility (PT)  AM-PAC 6 Clicks Score (PT): 18      Jerome Garza PT  11/7/2019

## 2019-11-07 NOTE — PROGRESS NOTES
[facility]  INPATIENT PROGRESS NOTE    Name: Nancy Shepherd  Age/Sex: 78 y.o. female  :  1940        PCP: Dominick Sandy MD    Vital Signs  Temp:  [97 °F (36.1 °C)-97.8 °F (36.6 °C)] 97.3 °F (36.3 °C)  Heart Rate:  [69-80] 77  Resp:  [18] 18  BP: ()/(46-62) 101/62  Body mass index is 19.71 kg/m².     Subjective Feeling better.    Patient Active Problem List   Diagnosis   • NSTEMI (non-ST elevated myocardial infarction) (CMS/[facility])       Past Medical History:   Diagnosis Date   • Diabetes mellitus (CMS/[facility])    • Hypertension        Current Facility-Administered Medications   Medication Dose Route Frequency Provider Last Rate Last Dose   • acetaminophen (TYLENOL) tablet 650 mg  650 mg Oral Q4H PRN Arleth Rosenberg MD       • aluminum-magnesium hydroxide-simethicone (MAALOX MAX) 400-400-40 MG/5ML suspension 15 mL  15 mL Oral Q6H PRN Arleth Rosenberg MD       • clopidogrel (PLAVIX) tablet 75 mg  75 mg Oral Daily Arleth Rosenberg MD   75 mg at 1957   • dextrose (D50W) 25 g/ 50mL Intravenous Solution 25 g  25 g Intravenous Q15 Min PRN Roscoe Gramajo, DO   25 g at 19 0642   • dextrose (D50W) 25 g/ 50mL Intravenous Solution 50 mL  50 mL Intravenous Q1H PRN Aura Kern MD       • dextrose (GLUTOSE) oral gel 15 g  15 g Oral Q15 Min PRN Roscoe Gramajo T, DO       • diphenhydrAMINE (BENADRYL) injection 25 mg  25 mg Intravenous Q6H PRN Arleth Rosenberg MD       • docusate sodium (COLACE) capsule 100 mg  100 mg Oral BID Polo Mora MD   100 mg at 19   • famotidine (PEPCID) tablet 40 mg  40 mg Oral Daily Polo Mora MD   40 mg at 1957   • glucagon (human recombinant) (GLUCAGEN DIAGNOSTIC) injection 1 mg  1 mg Subcutaneous Q15 Min PRN Roscoe Gramajo, DO       • HYDROcodone-acetaminophen (NORCO) 7.5-325 MG per tablet 1 tablet  1 tablet Oral Q4H PRN Arleth Rosenberg MD   1 tablet at 19 4428   •  insulin aspart (novoLOG) injection 0-7 Units  0-7 Units Subcutaneous 4x Daily AC & at Bedtime Roscoe Gramajo, DO       • magnesium hydroxide (MILK OF MAGNESIA) suspension 2400 mg/10mL 10 mL  10 mL Oral Daily PRN Arleth Rosenberg MD       • Magnesium Sulfate 2 gram Bolus, followed by 8 gram infusion (total Mg dose 10 grams)- Mg less than or equal to 1mg/dL  2 g Intravenous PRN Polo Mora MD        Or   • Magnesium Sulfate 2 gram / 50mL Infusion (GIVE X 3 BAGS TO EQUAL 6GM TOTAL DOSE) - Mg 1.1 - 1.5 mg/dl  2 g Intravenous PRN Polo Mora MD        Or   • Magnesium Sulfate 4 gram infusion- Mg 1.6-1.9 mg/dL  4 g Intravenous PRN Polo Mora MD       • melatonin tablet 3 mg  3 mg Oral Nightly PRN Polo Mora MD       • morphine injection 1 mg  1 mg Intravenous Q4H PRN Polo Mora MD        And   • naloxone (NARCAN) injection 0.4 mg  0.4 mg Intravenous Q5 Min PRN Polo Mora MD       • ondansetron (ZOFRAN) tablet 4 mg  4 mg Oral Q6H PRN Arleth Rosenberg MD        Or   • ondansetron (ZOFRAN) injection 4 mg  4 mg Intravenous Q6H PRN Arleth Rosenberg MD       • potassium & sodium phosphates (PHOS-NAK) 280-160-250 MG packet - for Phosphorus less than 1.25 mg/dL  2 packet Oral Q6H PRN Polo Mora MD        Or   • potassium & sodium phosphates (PHOS-NAK) 280-160-250 MG packet - for Phosphorus 1.25 - 2.5 mg/dL  2 packet Oral Q6H PRN Polo Mora MD       • potassium chloride (MICRO-K) CR capsule 40 mEq  40 mEq Oral PRN Polo Mora MD        Or   • potassium chloride (KLOR-CON) packet 40 mEq  40 mEq Oral PRN Polo Mora MD        Or   • potassium chloride 10 mEq in 100 mL IVPB  10 mEq Intravenous Q1H PRN Polo Mora MD       • rivaroxaban (XARELTO) tablet 15 mg  15 mg Oral BID With Meals Arleth Rosenberg MD   15 mg at 11/06/19 2843   • sodium chloride 0.9 % flush 10 mL  10 mL Intravenous Q12H Polo Mora MD   10 mL at 11/04/19  0027   • sodium chloride 0.9 % flush 10 mL  10 mL Intravenous PRN Polo Mora MD       • sodium chloride 0.9 % infusion  75 mL/hr Intravenous Continuous Roscoe Gramajo DO       • temazepam (RESTORIL) capsule 7.5 mg  7.5 mg Oral Nightly PRN Arleth Rosenberg MD           Past Surgical History:   Procedure Laterality Date   • CARDIAC CATHETERIZATION Right 11/4/2019    Procedure: Left Heart Cath;  Surgeon: Arleth Rosenberg MD;  Location[facility] CATH INVASIVE LOCATION;  Service: Cardiology       Social History     Socioeconomic History   • Marital status:      Spouse name: Not on file   • Number of children: Not on file   • Years of education: Not on file   • Highest education level: Not on file   Tobacco Use   • Smoking status: Former Smoker   • Smokeless tobacco: Former User   Substance and Sexual Activity   • Alcohol use: No     Frequency: Never   • Drug use: No   • Sexual activity: Defer       O/E    Neck: Supple.  No JVD, no thyroid enlargement.  Chest: Air entry equal, normal respiration.  No rhonchi or creps.  Cardiovascular system:  Regular rate and rhythm, no murmurs.  Abdomen: Soft, no tenderness, bowel sounds present, no hepatosplenomegaly.  CNS: Alert, oriented to place and time.  No motor or sensory deficit.  Cranial nerves intact.  Musculoskeletal: No deformity of the back or spine.  Extremities:  No edema.  Pulses equal on both sides.    Lab Results (last 24 hours)     Procedure Component Value Units Date/Time    POC Glucose Once [[id number]]  (Normal) Collected:  11/05/19 2100    Specimen:  Blood Updated:  11/05/19 2113     Glucose 107 mg/dL      Comment: : [id number] [facility] SHAWNAMeter ID: [id number]       POC Glucose Once [[id number]]  (Abnormal) Collected:  11/06/19 0634    Specimen:  Blood Updated:  11/06/19 0648     Glucose 60 mg/dL      Comment: : [id number] [facility] CHRISTOPHERMeter ID: [id number]       POC Glucose Once [[id number]]  (Normal) Collected:   11/06/19 1045    Specimen:  Blood Updated:  11/06/19 1143     Glucose 100 mg/dL      Comment: : [id number] JOSE TIFFANYMeter ID: [id number]       POC Glucose Once [[id number]]  (Normal) Collected:  11/06/19 1616    Specimen:  Blood Updated:  11/06/19 1638     Glucose 94 mg/dL      Comment: : [id number] JOSE TIFFANYMeter ID: [id number]             A/P  1.  Elevated troponin consistent with a non-STEMI-patient with borderline coronary disease.  Medical therapy is indicated.    2.  Vasovagal episode related to hypoglycemia.  Hypoglycemia is being worked up by the primary team.    She may be transfer or discharge home at your convenience we will see PRN.        This document has been electronically signed by Arleth Rosenberg MD on November 6, 2019 7:10 PM         Part of this note may be an electronic transcription/translation of spoken language to printed text using the Dragon Dictation System.

## 2019-11-08 PROBLEM — I27.20 PULMONARY HTN (HCC): Status: ACTIVE | Noted: 2019-01-01

## 2019-11-08 PROBLEM — R42 ORTHOSTATIC DIZZINESS: Status: ACTIVE | Noted: 2019-01-01

## 2019-11-08 PROBLEM — I26.99 OTHER ACUTE PULMONARY EMBOLISM WITHOUT ACUTE COR PULMONALE (HCC): Status: RESOLVED | Noted: 2019-01-01 | Resolved: 2019-01-01

## 2019-11-08 PROBLEM — R07.89 CHEST PAIN, ATYPICAL: Status: ACTIVE | Noted: 2019-01-01

## 2019-11-08 PROBLEM — I26.99 OTHER ACUTE PULMONARY EMBOLISM WITHOUT ACUTE COR PULMONALE (HCC): Status: ACTIVE | Noted: 2019-01-01

## 2019-11-08 PROBLEM — I26.99 ACUTE PULMONARY EMBOLISM WITHOUT ACUTE COR PULMONALE (HCC): Status: ACTIVE | Noted: 2019-01-01

## 2019-11-08 PROBLEM — I25.10 CAD (CORONARY ARTERY DISEASE): Status: ACTIVE | Noted: 2019-01-01

## 2019-11-08 PROBLEM — R77.8 ELEVATED TROPONIN: Status: ACTIVE | Noted: 2019-01-01

## 2019-11-08 NOTE — DISCHARGE SUMMARY
[facility]  DISCHARGE SUMMARY       Date of Admission: 11/3/2019  Date of Discharge:  11/8/2019  Primary Care Physician: Dominick Sandy MD    Presenting Problem/History of Present Illness:  NSTEMI (non-ST elevated myocardial infarction) (CMS/[facility]) [I21.4]       Final Discharge Diagnoses:  Active Hospital Problems    Diagnosis   • **Chest pain, atypical   • Acute pulmonary embolism without acute cor pulmonale (CMS/[facility])   • CAD (coronary artery disease)   • Pulmonary HTN (CMS/[facility])   • Elevated troponin   • Orthostatic dizziness       Consults:   Consults     Date and Time Order Name Status Description    11/4/2019 0742 Inpatient Cardiology Consult      11/3/2019 2231 Inpatient Cardiology Consult            Procedures Performed: Procedure(s):  Left Heart Cath                Pertinent Test Results:   Lab Results (most recent)     Procedure Component Value Units Date/Time    Basic Metabolic Panel [[id number]]  (Abnormal) Collected:  11/08/19 1241    Specimen:  Blood Updated:  11/08/19 1309     Glucose 93 mg/dL      BUN 9 mg/dL      Creatinine 0.79 mg/dL      Sodium 135 mmol/L      Potassium 5.0 mmol/L      Chloride 99 mmol/L      CO2 26.0 mmol/L      Calcium 9.8 mg/dL      eGFR  African Amer 85 mL/min/1.73      BUN/Creatinine Ratio 11.4     Anion Gap 10.0 mmol/L     Narrative:       GFR Normal >60  Chronic Kidney Disease <60  Kidney Failure <15    POC Glucose Once [[id number]]  (Normal) Collected:  11/08/19 1051    Specimen:  Blood Updated:  11/08/19 1121     Glucose 77 mg/dL      Comment: : [id number] MODE RONEY BMeter ID: [id number]       Basic Metabolic Panel [[id number]]  (Abnormal) Collected:  11/08/19 0654    Specimen:  Blood Updated:  11/08/19 0733     Glucose 72 mg/dL      BUN 10 mg/dL      Creatinine 0.78 mg/dL      Sodium 132 mmol/L      Potassium 5.4 mmol/L      Chloride 99 mmol/L      CO2 24.0 mmol/L      Calcium 9.4 mg/dL      eGFR    Amer 86 mL/min/1.73      BUN/Creatinine Ratio 12.8     Anion Gap 9.0 mmol/L     Narrative:       GFR Normal >60  Chronic Kidney Disease <60  Kidney Failure <15    Potassium [[id number]]  (Abnormal) Collected:  11/08/19 0654    Specimen:  Blood Updated:  11/08/19 0732     Potassium 5.4 mmol/L     CBC & Differential [[id number]] Collected:  11/08/19 0654    Specimen:  Blood Updated:  11/08/19 0707    Narrative:       The following orders were created for panel order CBC & Differential.  Procedure                               Abnormality         Status                     ---------                               -----------         ------                     CBC Auto Differential[[id number]]        Abnormal            Final result                 Please view results for these tests on the individual orders.    CBC Auto Differential [[id number]]  (Abnormal) Collected:  11/08/19 0654    Specimen:  Blood Updated:  11/08/19 0707     WBC 4.15 10*3/mm3      RBC 5.31 10*6/mm3      Hemoglobin 13.8 g/dL      Hematocrit 38.3 %      MCV 72.1 fL      MCH 26.0 pg      MCHC 36.0 g/dL      RDW 14.7 %      RDW-SD 37.6 fl      MPV 9.9 fL      Platelets 174 10*3/mm3      Neutrophil % 42.7 %      Lymphocyte % 39.0 %      Monocyte % 15.2 %      Eosinophil % 1.9 %      Basophil % 1.0 %      Immature Grans % 0.2 %      Neutrophils, Absolute 1.77 10*3/mm3      Lymphocytes, Absolute 1.62 10*3/mm3      Monocytes, Absolute 0.63 10*3/mm3      Eosinophils, Absolute 0.08 10*3/mm3      Basophils, Absolute 0.04 10*3/mm3      Immature Grans, Absolute 0.01 10*3/mm3      nRBC 0.0 /100 WBC     POC Glucose Once [[id number]]  (Normal) Collected:  11/08/19 0616    Specimen:  Blood Updated:  11/08/19 0630     Glucose 71 mg/dL      Comment: RN NotifiedOperator: [id number] DEBBIE CLAYFERMeter ID: [id number]       Potassium [[id number]]  (Abnormal) Collected:  11/07/19 2147    Specimen:  Blood Updated:  11/07/19 2205     Potassium 6.2 mmol/L      Comment:  Specimen recollected to verify results       CBC & Differential [[id number]] Collected:  11/07/19 0606    Specimen:  Blood Updated:  11/07/19 0658    Narrative:       The following orders were created for panel order CBC & Differential.  Procedure                               Abnormality         Status                     ---------                               -----------         ------                     CBC Auto Differential[[id number]]        Abnormal            Final result                 Please view results for these tests on the individual orders.    CBC Auto Differential [[id number]]  (Abnormal) Collected:  11/07/19 0606    Specimen:  Blood Updated:  11/07/19 0658     WBC 4.10 10*3/mm3      RBC 5.20 10*6/mm3      Hemoglobin 13.4 g/dL      Hematocrit 36.8 %      MCV 70.8 fL      MCH 25.8 pg      MCHC 36.4 g/dL      RDW 14.6 %      RDW-SD 36.3 fl      MPV 10.1 fL      Platelets 167 10*3/mm3      Neutrophil % 41.5 %      Lymphocyte % 43.2 %      Monocyte % 13.7 %      Eosinophil % 0.7 %      Basophil % 0.7 %      Immature Grans % 0.2 %      Neutrophils, Absolute 1.70 10*3/mm3      Lymphocytes, Absolute 1.77 10*3/mm3      Monocytes, Absolute 0.56 10*3/mm3      Eosinophils, Absolute 0.03 10*3/mm3      Basophils, Absolute 0.03 10*3/mm3      Immature Grans, Absolute 0.01 10*3/mm3      nRBC 0.0 /100 WBC     Scan Slide [[id number]] Collected:  11/06/19 1903    Specimen:  Blood Updated:  11/06/19 2041     RBC Morphology Normal     WBC Morphology Normal     Platelet Estimate Adequate    BNP [[id number]]  (Abnormal) Collected:  11/06/19 1903    Specimen:  Blood Updated:  11/06/19 1934     proBNP 4,654.0 pg/mL     Narrative:       Among patients with dyspnea, NT-proBNP is highly sensitive for the detection of acute congestive heart failure. In addition NT-proBNP of <300 pg/ml effectively rules out acute congestive heart failure with 99% negative predictive value.    Lipid Panel [[id number]]  (Abnormal) Collected:  11/05/19  0600    Specimen:  Blood Updated:  11/05/19 1218     Total Cholesterol 168 mg/dL      Triglycerides 105 mg/dL      HDL Cholesterol 36 mg/dL      LDL Cholesterol  111 mg/dL      VLDL Cholesterol 21 mg/dL      LDL/HDL Ratio 3.08    Narrative:       Cholesterol Reference Ranges  (U.S. Department of Health and Human Services ATP III Classifications)    Desirable          <200 mg/dL  Borderline High    200-239 mg/dL  High Risk          >240 mg/dL      Triglyceride Reference Ranges  (U.S. Department of Health and Human Services ATP III Classifications)    Normal           <150 mg/dL  Borderline High  150-199 mg/dL  High             200-499 mg/dL  Very High        >500 mg/dL    HDL Reference Ranges  (U.S. Department of Health and Human Services ATP III Classifcations)    Low     <40 mg/dl (major risk factor for CHD)  High    >60 mg/dl ('negative' risk factor for CHD)        LDL Reference Ranges  (U.S. Department of Health and Human Services ATP III Classifcations)    Optimal          <100 mg/dL  Near Optimal     100-129 mg/dL  Borderline High  130-159 mg/dL  High             160-189 mg/dL  Very High        >189 mg/dL    Hemoglobin A1c [[id number]]  (Normal) Collected:  11/04/19 0559    Specimen:  Blood Updated:  11/05/19 0743     Hemoglobin A1C 5.30 %     Narrative:       Hemoglobin A1C Ranges:    Increased Risk for Diabetes  5.7% to 6.4%  Diabetes                     >= 6.5%  Diabetic Goal                < 7.0%    CBC (No Diff) [[id number]]  (Abnormal) Collected:  11/05/19 0642    Specimen:  Blood Updated:  11/05/19 0709     WBC 6.18 10*3/mm3      RBC 6.22 10*6/mm3      Hemoglobin 16.0 g/dL      Hematocrit 44.4 %      MCV 71.4 fL      MCH 25.7 pg      MCHC 36.0 g/dL      RDW 15.3 %      RDW-SD 37.1 fl      MPV 9.7 fL      Platelets 174 10*3/mm3     D-dimer, Quantitative [[id number]]  (Abnormal) Collected:  11/04/19 1250    Specimen:  Blood Updated:  11/04/19 1325     D-Dimer, Quantitative >4,000 ng/mL (FEU)     Narrative:        Dimer values <500 ng/ml FEU are FDA approved as aid in diagnosis of deep venous thrombosis and pulmonary embolism.  This test should not be used in an exclusion strategy with pretest probability alone.    A recent guideline regarding diagnosis for pulmonary thromboembolism recommends an adjusted exclusion criterion of age x 10 ng/ml FEU for patients >50 years of age ([employer] Intern Med 2015; 163: 701-711).    Comprehensive Metabolic Panel [[id number]]  (Abnormal) Collected:  11/04/19 0609    Specimen:  Blood Updated:  11/04/19 0641     Glucose 53 mg/dL      BUN 13 mg/dL      Creatinine 0.77 mg/dL      Sodium 138 mmol/L      Potassium 3.9 mmol/L      Chloride 103 mmol/L      CO2 19.0 mmol/L      Calcium 9.6 mg/dL      Total Protein 6.7 g/dL      Albumin 3.50 g/dL      ALT (SGPT) 15 U/L      AST (SGOT) 36 U/L      Alkaline Phosphatase 113 U/L      Total Bilirubin 0.7 mg/dL      eGFR  African Amer 88 mL/min/1.73      Globulin 3.2 gm/dL      A/G Ratio 1.1 g/dL      BUN/Creatinine Ratio 16.9     Anion Gap 16.0 mmol/L     Narrative:       GFR Normal >60  Chronic Kidney Disease <60  Kidney Failure <15    Magnesium [[id number]]  (Normal) Collected:  11/04/19 0609    Specimen:  Blood Updated:  11/04/19 0641     Magnesium 1.7 mg/dL     Phosphorus [[id number]]  (Normal) Collected:  11/04/19 0609    Specimen:  Blood Updated:  11/04/19 0641     Phosphorus 3.6 mg/dL     Troponin [[id number]]  (Abnormal) Collected:  11/04/19 0609    Specimen:  Blood Updated:  11/04/19 0634     Troponin T 0.177 ng/mL     Narrative:       Troponin T Reference Range:  <= 0.03 ng/mL-   Negative for AMI  >0.03 ng/mL-     Abnormal for myocardial necrosis.  Clinicians would have to utilize clinical acumen, EKG, Troponin and serial changes to determine if it is an Acute Myocardial Infarction or myocardial injury due to an underlying chronic condition.     TSH [[id number]]  (Normal) Collected:  11/03/19 2343    Specimen:  Blood Updated:  11/04/19 0034     TSH  2.410 uIU/mL     Troponin [[id number]]  (Abnormal) Collected:  11/03/19 2343    Specimen:  Blood Updated:  11/04/19 0030     Troponin T 0.182 ng/mL     Narrative:       Troponin T Reference Range:  <= 0.03 ng/mL-   Negative for AMI  >0.03 ng/mL-     Abnormal for myocardial necrosis.  Clinicians would have to utilize clinical acumen, EKG, Troponin and serial changes to determine if it is an Acute Myocardial Infarction or myocardial injury due to an underlying chronic condition.     BNP [[id number]]  (Abnormal) Collected:  11/03/19 2343    Specimen:  Blood Updated:  11/04/19 0026     proBNP 8,066.0 pg/mL     Narrative:       Among patients with dyspnea, NT-proBNP is highly sensitive for the detection of acute congestive heart failure. In addition NT-proBNP of <300 pg/ml effectively rules out acute congestive heart failure with 99% negative predictive value.        Imaging Results (Most Recent)     Procedure Component Value Units Date/Time    CT Angiogram Chest With & Without Contrast [[id number]] Collected:  11/04/19 1403     Updated:  11/04/19 1526    Narrative:       EXAM: CT ANGIOGRAM CHEST W WO CONTRAST    DATE: 11/4/2019 2:03 PM CST    TECHNIQUE: Axial images were obtained at 2 mm intervals from the  level of thoracic inlet through the upper abdomen during bolus  infusion of IV contrast.      This exam was performed according to our departmental  dose-optimization program, which includes automated exposure  control, adjustment of the mA and/or kV according to patient size  and/or use of iterative reconstruction technique.    CLINICAL INDICATION: Chest pain, acute, PE suspected, high  pretest prob, I21.4 Non-ST elevation (NSTEMI) myocardial  infarction    COMPARISON: None available.    FINDINGS: There is a 1.6 cm noncalcified pulmonary nodule in the  superior segment of the right lower lobe. The findings are  concerning for primary lung neoplasm. Further evaluation with PET  scan and/or FNA is recommended.    The  "pulmonary arteries are well-opacified. There are no central  filling defects at L5. There is a subtle serpiginous/linear  filling defect noted within a subsegmental branch of the left  upper lobe best seen on images 62 and 63 of series 14 and image  60 and 61 of series 11. These findings are concerning for a small  subsegmental pulmonary was in the left upper lobe.    No focal infiltrates or evidence of pulmonary infarction is seen.  There is no definitive enlarged mediastinal or hilar adenopathy..      Impression:       1. 1.6 cm noncalcified well-defined pulmonary nodule in the  superior segment of the right lower lobe. Neoplasm cannot be  excluded and follow-up is recommended with a PET scan and/or FNA  for diagnosis and staging.  2. Suspected small subsegmental embolus in the left upper lobe.    2017 Fleischner Society Recommendations for Single Solid Lung  Nodule Follow-Up based on size (average of long- and short-axis  diameters)    <6 mm Low-Risk Patient: No routine follow-up   <6 mm High-Risk Patient: Optional CT at 12 months    6-8 mm Low-Risk Patient: CT at 6-12 months then consider CT at  18-24 months  6-8 mm High-Risk Patient: CT at 6-12 months then CT at 18-24  months    >8 mm Low-Risk Patient: Consider CT, PET/CT or tissue sampling at  3 months  >8 mm High-Risk Patient: Same as for low-risk patient     Electronically signed by:  Angely Saenz MD  11/4/2019 3:25 PM  CST Workstation: [id number]          Chief Complaint on Day of Discharge: None.  Patient denies any chest pain, shortness of breath, abdominal pain or any other concerning symptoms.    Hospital Course:  The patient is a 78 y.o. female who presented to [facility] with the following:    From H&P:\"The patient is a 78-year old  woman who was transferred to this facility from [facility] with a history of chest pain.  According to her, she has had recurrent episodes of chest pain for at least 1 month or more.  She " "decided to go to [facility] yesterday because her symptoms seem to have gotten worse.  She has shortness of breath and a cough which is productive for whitish sputum.  She has no fever or chills, no palpitations, headache, PND, orthopnea.  She did not characterize her pain as such.  Pain is not effort related.  Initial troponin obtained at the referring hospital was said to be slightly elevated and was reported as 0.160.  Subsequent values was 0.182 and 0.177.\"    Hospital course: The following problems were addressed while in the hospital    1.  Non-ST elevation myocardial infarction: Seen by cardiology.  Left heart cath showed 50% mid circumflex disease.  Imdur was stopped and patient was started back on low-dose of Toprol-XL per cardiology recommendation.  2.  Diabetes mellitus:  Now with intermittent episodes of hypoglycemia.  Likely secondary to decreased p.o. intake.    Will stop metformin that she is taking at home.  Stressed the importance of taking snacks like crackers, peanut butter every night at bedtime with patient as well as her family and they expressed understanding.  I stressed that low blood sugars can be fatal and they expressed understanding..  Patient was recommended to check blood sugars at least 4 times a day and also when she has any symptoms.  I had to call in the glucose test strips to the pharmacy.  3.  Probable small subsegmental embolus in the left upper lobe: On Xarelto.  Have ordered anticoagulation clinic follow-up.  4.  Episode of bradycardia: Resolved.    5.  GERD: Continue current treatment.  6.  1.6 cm noncalcified pulmonary nodule in the right lower lobe: We will have patient follow-up with pulmonology as outpatient.  7.  Hypotension:new, improved, but borderline.  Cardiology recommended continuing low-dose of metoprolol and hold Imdur.  No signs of infection.  Monitor     .      Condition on Discharge: Fair    Physical Exam on Discharge:  BP 92/64 (BP Location: Right arm, " "Patient Position: Sitting)   Pulse 64   Temp 97.3 °F (36.3 °C) (Temporal)   Resp 18   Ht 162.6 cm (64\")   Wt 54.3 kg (119 lb 12.8 oz)   SpO2 94%   BMI 20.56 kg/m²   Physical Exam   Constitutional: She appears cachectic.   HENT:   Head: Normocephalic and atraumatic.   Eyes: Pupils are equal, round, and reactive to light.   Neck: Normal range of motion.   Cardiovascular: Normal rate.   Pulmonary/Chest: She has decreased breath sounds. She has no wheezes.   Abdominal: Soft. There is no tenderness.   Musculoskeletal: She exhibits no edema.   Neurological: She is alert.   Skin: Skin is warm and dry.   Psychiatric: She has a normal mood and affect.         Discharge Disposition:  Home or Self Care    Discharge Medications:     Discharge Medications      New Medications      Instructions Start Date   clopidogrel 75 MG tablet  Commonly known as:  PLAVIX   75 mg, Oral, Daily   Start Date:  11/9/2019     docusate sodium 100 MG capsule   100 mg, Oral, 2 Times Daily      famotidine 40 MG tablet  Commonly known as:  PEPCID   40 mg, Oral, Daily   Start Date:  11/9/2019     glucose monitor monitoring kit   1 each, Does not apply, As Needed      Lancets Thin misc   1 each, Does not apply, 4 Times Daily      melatonin 3 MG tablet   3 mg, Oral, Nightly PRN      metoprolol succinate XL 25 MG 24 hr tablet  Commonly known as:  TOPROL-XL   12.5 mg, Oral, Every 24 Hours Scheduled   Start Date:  11/9/2019     Rivaroxaban tablet therapy pack starter pack  Commonly known as:  XARELTO STARTER PACK   Take as directed         Continue These Medications      Instructions Start Date   ipratropium-albuterol 0.5-2.5 mg/3 ml nebulizer  Commonly known as:  DUO-NEB   Nebulization      pravastatin 40 MG tablet  Commonly known as:  PRAVACHOL   80 mg, Oral, Daily         Stop These Medications    carvedilol 3.125 MG tablet  Commonly known as:  COREG     furosemide 20 MG tablet  Commonly known as:  LASIX     metFORMIN 500 MG tablet  Commonly known " as:  GLUCOPHAGE     omeprazole 20 MG capsule  Commonly known as:  priLOSEC     potassium chloride 10 MEQ CR capsule  Commonly known as:  MICRO-K     raNITIdine 150 MG tablet  Commonly known as:  ZANTAC            Discharge Diet: Regular diet    Activity at Discharge: Ad belem.    Discharge Care Plan/Instructions: Take medications as prescribed.  Check blood sugars and blood pressure at home and follow-up with PCP in 1 week.  Stressed the importance of taking snacks at bedtime as her blood sugars seem to drop in the morning.return to ER for any worsening symptoms.    Follow-up Appointments:   No future appointments.   PCP in 1 week  Pulmonology in 1 to 2 weeks.    Test Results Pending at Discharge:     Signed     Dr Roscoe Gramajo DO   11/8/2019  7:14 PM      Time: More than 30 minutes spent on discharge

## 2019-11-08 NOTE — PROGRESS NOTES
"[facility] Progress Note   LOS: 5 days   Patient Care Team:  Dominick Sandy MD as PCP - General (Family Medicine)    Chief Complaint: chest pain     Subjective     Interval History:   Patient Denies: shortness of air and chest pain  Patient Complaints: dizziness  History taken from: patient    Objective     Vital Sign Min/Max for last 24 hours  Temp  Min: 97.1 °F (36.2 °C)  Max: 97.9 °F (36.6 °C)   BP  Min: 94/55  Max: 110/55   Pulse  Min: 63  Max: 83   Resp  Min: 17  Max: 20   SpO2  Min: 84 %  Max: 99 %   Flow (L/min)  Min: 2  Max: 2   Weight  Min: 54.3 kg (119 lb 12.8 oz)  Max: 54.3 kg (119 lb 12.8 oz)     Flowsheet Rows      First Filed Value   Admission Height  162.6 cm (64\") Documented at 11/04/2019 0018   Admission Weight  62 kg (136 lb 11 oz) Documented at 11/03/2019 1900            11/06/19  0453 11/07/19  0613 11/08/19  0420   Weight: 52.1 kg (114 lb 12.8 oz) 49.4 kg (109 lb) 54.3 kg (119 lb 12.8 oz)       Physical Exam:  Physical Exam   Constitutional: She appears cachectic.   HENT:   Head: Normocephalic and atraumatic.   Eyes: Pupils are equal, round, and reactive to light.   Neck: Normal range of motion.   Cardiovascular: Normal rate.   Pulmonary/Chest: She has decreased breath sounds. She has no wheezes.   Abdominal: Soft. There is no tenderness.   Musculoskeletal: She exhibits no edema.   Neurological: She is alert.   Skin: Skin is warm and dry.   Psychiatric: She has a normal mood and affect.        Results Review:     Results from last 7 days   Lab Units 11/08/19  0654 11/07/19  2147 11/07/19  0606 11/05/19  0600 11/04/19  0609   SODIUM mmol/L 132*  --  137 141 138   POTASSIUM mmol/L 5.4*  5.4* 6.2* 2.9* 4.0 3.9   CHLORIDE mmol/L 99  --  100 100 103   CO2 mmol/L 24.0  --  23.0 15.0* 19.0*   BUN mg/dL 10  --  13 11 13   CREATININE mg/dL 0.78  --  0.72 0.77 0.77   CALCIUM mg/dL 9.4  --  8.7 9.3 9.6   BILIRUBIN mg/dL  --   --   --   --  0.7   ALK PHOS U/L  --   --   --   --  113   ALT (SGPT) " U/L  --   --   --   --  15   AST (SGOT) U/L  --   --   --   --  36*   GLUCOSE mg/dL 72  --  60* 32* 53*       Estimated Creatinine Clearance: 48.9 mL/min (by C-G formula based on SCr of 0.78 mg/dL).    Results from last 7 days   Lab Units 11/04/19  0609   MAGNESIUM mg/dL 1.7   PHOSPHORUS mg/dL 3.6     Results from last 7 days   Lab Units 11/08/19  0654 11/07/19  0606 11/06/19  1903 11/05/19  0642 11/04/19  0559   WBC 10*3/mm3 4.15 4.10 4.73 6.18 5.08   HEMOGLOBIN g/dL 13.8 13.4 14.5 16.0* 15.1   PLATELETS 10*3/mm3 174 167 168 174 185           Lab Results   Component Value Date    PROBNP 4,654.0 (H) 11/06/2019       I/O last 3 completed shifts:  In: 1553.8 [P.O.:840; I.V.:713.8]  Out: -     Cardiographics:       Results for orders placed during the hospital encounter of 11/03/19   Adult Transthoracic Echo Complete W/ Cont if Necessary Per Protocol    Narrative · Left ventricular wall thickness is consistent with hypertrophy.  · Left ventricular systolic function is normal.  · Right ventricular cavity is severely dilated.  · Severe tricuspid valve regurgitation is present.  · Mild pulmonic valve regurgitation is present.     1.  Severe pulmonary hypertension.    Will assess for pulmonary embolus.         Ct Angiogram Chest With & Without Contrast    Result Date: 11/4/2019  1. 1.6 cm noncalcified well-defined pulmonary nodule in the superior segment of the right lower lobe. Neoplasm cannot be excluded and follow-up is recommended with a PET scan and/or FNA for diagnosis and staging. 2. Suspected small subsegmental embolus in the left upper lobe. 2017 Fleischner Society Recommendations for Single Solid Lung Nodule Follow-Up based on size (average of long- and short-axis diameters) <6 mm Low-Risk Patient: No routine follow-up <6 mm High-Risk Patient: Optional CT at 12 months 6-8 mm Low-Risk Patient: CT at 6-12 months then consider CT at 18-24 months 6-8 mm High-Risk Patient: CT at 6-12 months then CT at 18-24 months >8  mm Low-Risk Patient: Consider CT, PET/CT or tissue sampling at 3 months >8 mm High-Risk Patient: Same as for low-risk patient Electronically signed by:  Angely Saenz MD  11/4/2019 3:25 PM [facility] Workstation: [id number]      Medication Review:     Current Facility-Administered Medications:   •  acetaminophen (TYLENOL) tablet 650 mg, 650 mg, Oral, Q4H PRN, Arleth Rosenberg MD  •  clopidogrel (PLAVIX) tablet 75 mg, 75 mg, Oral, Daily, Arleth Rosenberg MD, 75 mg at 11/08/19 0823  •  dextrose (D50W) 25 g/ 50mL Intravenous Solution 25 g, 25 g, Intravenous, Q15 Min PRN, Roscoe Gramajo DO, 25 g at 11/06/19 0642  •  dextrose (D50W) 25 g/ 50mL Intravenous Solution 50 mL, 50 mL, Intravenous, Q1H PRN, Aura Kern MD  •  dextrose (GLUTOSE) oral gel 15 g, 15 g, Oral, Q15 Min PRN, Roscoe Gramajo DO  •  diphenhydrAMINE (BENADRYL) injection 25 mg, 25 mg, Intravenous, Q6H PRN, Arleth Rosenberg MD  •  docusate sodium (COLACE) capsule 100 mg, 100 mg, Oral, BID, Polo Mora MD, 100 mg at 11/08/19 0823  •  famotidine (PEPCID) tablet 40 mg, 40 mg, Oral, Daily, Polo Mora MD, 40 mg at 11/08/19 0823  •  glucagon (human recombinant) (GLUCAGEN DIAGNOSTIC) injection 1 mg, 1 mg, Subcutaneous, Q15 Min PRN, Roscoe Gramajo DO  •  HYDROcodone-acetaminophen (NORCO) 7.5-325 MG per tablet 1 tablet, 1 tablet, Oral, Q4H PRN, Arleth Rosenberg MD, 1 tablet at 11/04/19 1756  •  insulin aspart (novoLOG) injection 0-7 Units, 0-7 Units, Subcutaneous, 4x Daily AC & at Bedtime, Roscoe Gramajo DO, 3 Units at 11/06/19 2042  •  magnesium hydroxide (MILK OF MAGNESIA) suspension 2400 mg/10mL 10 mL, 10 mL, Oral, Daily PRN, Arleth Rosenberg MD  •  Magnesium Sulfate 2 gram Bolus, followed by 8 gram infusion (total Mg dose 10 grams)- Mg less than or equal to 1mg/dL, 2 g, Intravenous, PRN **OR** Magnesium Sulfate 2 gram / 50mL Infusion (GIVE X 3 BAGS TO EQUAL 6GM TOTAL DOSE) - Mg 1.1 - 1.5 mg/dl, 2 g, Intravenous,  PRN **OR** Magnesium Sulfate 4 gram infusion- Mg 1.6-1.9 mg/dL, 4 g, Intravenous, PRN, Polo Mora MD  •  melatonin tablet 3 mg, 3 mg, Oral, Nightly PRN, Polo Mora MD  •  metoprolol succinate XL (TOPROL-XL) 24 hr half tablet 12.5 mg, 12.5 mg, Oral, Q24H, Roscoe Gramajo DO, 12.5 mg at 11/08/19 1046  •  morphine injection 1 mg, 1 mg, Intravenous, Q4H PRN **AND** naloxone (NARCAN) injection 0.4 mg, 0.4 mg, Intravenous, Q5 Min PRN, Polo Mora MD  •  ondansetron (ZOFRAN) tablet 4 mg, 4 mg, Oral, Q6H PRN **OR** ondansetron (ZOFRAN) injection 4 mg, 4 mg, Intravenous, Q6H PRN, Arleth Rosenberg MD, 4 mg at 11/07/19 0840  •  potassium & sodium phosphates (PHOS-NAK) 280-160-250 MG packet - for Phosphorus less than 1.25 mg/dL, 2 packet, Oral, Q6H PRN **OR** potassium & sodium phosphates (PHOS-NAK) 280-160-250 MG packet - for Phosphorus 1.25 - 2.5 mg/dL, 2 packet, Oral, Q6H PRN, Polo Mora MD  •  potassium chloride (MICRO-K) CR capsule 40 mEq, 40 mEq, Oral, PRN, 40 mEq at 11/07/19 1643 **OR** potassium chloride (KLOR-CON) packet 40 mEq, 40 mEq, Oral, PRN **OR** potassium chloride 10 mEq in 100 mL IVPB, 10 mEq, Intravenous, Q1H PRN, Polo Mora MD  •  rivaroxaban (XARELTO) tablet 15 mg, 15 mg, Oral, BID With Meals, rAleth Rosenberg MD, 15 mg at 11/08/19 0823  •  sodium chloride 0.9 % flush 10 mL, 10 mL, Intravenous, Q12H, Polo Mora MD, 10 mL at 11/08/19 0824  •  sodium chloride 0.9 % flush 10 mL, 10 mL, Intravenous, PRN, Polo Mora MD  •  temazepam (RESTORIL) capsule 7.5 mg, 7.5 mg, Oral, Nightly PRN, Arleth Rosenberg MD    Assessment/Plan       Chest pain, atypical    Acute pulmonary embolism without acute cor pulmonale (CMS/HCC)    CAD (coronary artery disease)    Pulmonary HTN (CMS/HCC)    Elevated troponin    Orthostatic dizziness    ASCAD/chest pain.  Continue current treatment regimen.  Dietary sodium restriction.  Weight loss.  Regular aerobic  exercise.  Continue current medications.  Antiplatelet therapy: Plavix 75mg    Beta-blocker:  Toprol XL 12.5mg    Statin: pravastatin 80mg    ASA: n/a, xarelto    Likely Acute VIN PE  - Xarelto 15mg BID x 21 days then 20mg daily  - will need to start on day 6 of acute dose pack.  - complete 6 months of therapy and should be able to stop.    Orthostatic dizziness  - recommend rising slowly  - compression hose  - adequate hydration of 64oz daily.     HFpEF/pulmonary HTN  - will not tolerate additional afterload reducing medication at this time  - continue 02 at home  - Imdur would be helpful in her case but not with hypotension      Plan for disposition:Where: home and When:  today             This document has been electronically signed by MIHAI Olivas on November 8, 2019 10:55 AM

## 2019-11-08 NOTE — CONSULTS
"Adult Nutrition  Assessment    Patient Name:  Nancy Shepherd  YOB: 1940  MRN: [id number]  Admit Date:  11/3/2019    Assessment Date:  11/8/2019    Comments:  Pt is being discharged.  MD asked RD to see pt to discuss nutrition due to her blood sugars being persistently low.  He is not going to put her on any oral agents at homes for DM.  He too feels that she does not eat enough.  Pt states that her blood sugars do not go low at home.  She eats what she wants when she wants and feels that she will eat better at home.  However, her family reports that she eats very little.  RD provided information on Nutrient dense snacks and beverages, supplements, and information on Hypoglycemia.  Encouraged snacks and supplements.  They voiced understanding.  Diet copy and contact number provided.    Reason for Assessment     Row Name 11/08/19 1403          Reason for Assessment    Reason For Assessment  follow-up protocol         Nutrition/Diet History     Row Name 11/08/19 1403          Nutrition/Diet History    Typical Food/Fluid Intake  Pt  states that she does not have problems with her blood sugars going low at home.  \"I know what I like to eat and I eat it.\"  She does not like some of the food here.  Her family reports that she does not eat much at home.  they will watch her & try to encourage her to eat more and get her some of the supplements           Labs/Tests/Procedures/Meds     Row Name 11/08/19 1404          Labs/Procedures/Meds    Lab Results Reviewed  reviewed, pertinent     Lab Results Comments  FSBS:  106/105/113/72/77        Diagnostic Tests/Procedures    Diagnostic Test/Procedure Reviewed  reviewed, pertinent        Medications    Pertinent Medications Reviewed  reviewed, pertinent     Pertinent Medications Comments  SSI; Xarelto         Physical Findings     Row Name 11/08/19 1406          Physical Findings    Overall Physical Appearance  loss of subcutaneous fat;loss of muscle mass;on " oxygen therapy           Nutrition Prescription Ordered     Row Name 11/08/19 1406          Nutrition Prescription PO    Current PO Diet  Regular     Fluid Consistency  Thin     Supplement  Boost Glucose Control (Glucerna Shake);Ice Cream     Supplement Frequency  2 times a day;3 times a day     Common Modifiers  Cardiac;Consistent Carbohydrate         Evaluation of Received Nutrient/Fluid Intake     Row Name 11/08/19 1406          PO Evaluation    Number of Days PO Intake Evaluated  2 days     Number of Meals  4     % PO Intake  25% x3; 1 50%               Electronically signed by:  Martha Chance RD  11/08/19 2:12 PM

## 2019-11-08 NOTE — PLAN OF CARE
Problem: Patient Care Overview  Goal: Plan of Care Review  Outcome: Ongoing (interventions implemented as appropriate)   11/08/19 1145   OTHER   Outcome Summary Pt ivania tx well but gt was deferred by staff due to dropping BP with position changes and static standing. Pt instead performed B LE therex at EOB. Pt t/f sup-sit-sup SBAx1, sit-stand-sit SBAx1.

## 2019-11-08 NOTE — PLAN OF CARE
Problem: Skin Injury Risk (Adult)  Goal: Skin Health and Integrity  Outcome: Ongoing (interventions implemented as appropriate)      Problem: Patient Care Overview  Goal: Plan of Care Review  Outcome: Ongoing (interventions implemented as appropriate)   11/08/19 0433   Coping/Psychosocial   Plan of Care Reviewed With patient   Plan of Care Review   Progress no change   OTHER   Outcome Summary K2.9. Protocol given. 6.2 upon recheck. Given calcium gluconate, kaexylate, D50, 10 units humilin. Will recheck this AM.        Problem: Cardiac: ACS (Acute Coronary Syndrome) (Adult)  Goal: Signs and Symptoms of Listed Potential Problems Will be Absent, Minimized or Managed (Cardiac: ACS)  Outcome: Ongoing (interventions implemented as appropriate)      Problem: Fall Risk (Adult)  Goal: Absence of Fall  Outcome: Ongoing (interventions implemented as appropriate)

## 2019-11-08 NOTE — THERAPY TREATMENT NOTE
Acute Care - Physical Therapy Treatment Note  [facility]     Patient Name: Nancy Shepherd  : 1940  MRN: [id number]  Today's Date: 2019  Onset of Illness/Injury or Date of Surgery: 19  Date of Referral to PT: 19  Referring Physician: BENIGNO Gramajo DO.    Admit Date: 11/3/2019    Visit Dx:    ICD-10-CM ICD-9-CM   1. NSTEMI (non-ST elevated myocardial infarction) (CMS/HCC) I21.4 410.70   2. Impaired physical mobility Z74.09 781.99     Patient Active Problem List   Diagnosis   • NSTEMI (non-ST elevated myocardial infarction) (CMS/HCC)   • Acute pulmonary embolism without acute cor pulmonale (CMS/HCC)   • CAD (coronary artery disease)   • Pulmonary HTN (CMS/HCC)   • Chest pain, atypical   • Elevated troponin   • Orthostatic dizziness       Therapy Treatment    Rehabilitation Treatment Summary     Row Name 19 1100             Treatment Time/Intention    Discipline  physical therapy assistant  -EM      Document Type  therapy note (daily note)  -EM      Subjective Information  no complaints  -EM      Mode of Treatment  physical therapy;individual therapy  -EM      Patient Effort  good  -EM      Recorded by [EM] Dusty Brannon, PTA 19 1144      Row Name 19 1100             Vital Signs    Pre Systolic BP Rehab  84 man.  -EM      Pre Treatment Diastolic BP  52  -EM      Intra Systolic BP Rehab  59  -EM      Intra Treatment Diastolic BP  57  -EM      Post Systolic BP Rehab  97  -EM      Post Treatment Diastolic BP  56  -EM      Pretreatment Heart Rate (beats/min)  69  -EM      Intratreatment Heart Rate (beats/min)  71  -EM      Posttreatment Heart Rate (beats/min)  72  -EM      Pre SpO2 (%)  95  -EM      O2 Delivery Pre Treatment  supplemental O2  -EM      Post SpO2 (%)  92  -EM      O2 Delivery Post Treatment  supplemental O2  -EM      Pre Patient Position  Supine  -EM      Intra Patient Position  Standing  -EM      Post Patient Position  Supine  -EM      Recorded by  [EM] Dusty Brannon, PTA 11/08/19 1144      Row Name 11/08/19 1100             Cognitive Assessment/Intervention- PT/OT    Orientation Status (Cognition)  oriented x 4  -EM      Follows Commands (Cognition)  WFL  -EM      Recorded by [EM] Dusty Brannon, PTA 11/08/19 1144      Row Name 11/08/19 0910             Bed Mobility Assessment/Treatment    Supine-Sit [place] (Bed Mobility)  supervision  -EM      Sit-Supine [place] (Bed Mobility)  supervision  -EM      Recorded by [EM] Dusty Brannon, PTA 11/08/19 1144      Row Name 11/08/19 0910             Sit-Stand Transfer    Sit-Stand [place] (Transfers)  supervision  -EM      Recorded by [EM] Dusty Brannon, PTA 11/08/19 1144      Row Name 11/08/19 0910             Stand-Sit Transfer    Stand-Sit [place] (Transfers)  supervision  -EM      Recorded by [EM] Dusty Brannon, PTA 11/08/19 1144      Row Name 11/08/19 0910             Gait/Stairs Assessment/Training    Comment (Gait/Stairs)  Gt deferred due to dropping BP.   -EM      Recorded by [EM] Dusty Brannon, PTA 11/08/19 1144      Row Name 11/08/19 0910             Therapeutic Exercise    Lower Extremity (Therapeutic Exercise)  SLR (straight leg raise), bilateral;LAQ (long arc quad), bilateral;marching while seated AP, Add Pillow squeeze  -EM      Exercise Type (Therapeutic Exercise)  AROM (active range of motion)  -EM      Position (Therapeutic Exercise)  seated  -EM      Sets/Reps (Therapeutic Exercise)  1x20  -EM      Recorded by [EM] Dusty Brannon, PTA 11/08/19 1144      Row Name 11/08/19 0910             Positioning and Restraints    Pre-Treatment Position  in bed  -EM      Post Treatment Position  bed  -EM      In Bed  supine;call light within reach;encouraged to call for assist;exit alarm on;notified nsg  -EM      Recorded by [EM] Dusty Brannon, PTA 11/08/19 1144      Row Name 11/08/19 0910             Outcome Summary/Treatment Plan (PT)    Plan for Continued Treatment (PT)  Cont current  POC, progress as ivania. Watch BP.  -EM      Anticipated Discharge Disposition (PT)  anticipate therapy at next level of care  -EM      Recorded by [EM] Dusty Brannon, PTA 11/08/19 1144        User Key  (r) = Recorded By, (t) = Taken By, (c) = Cosigned By    Initials Name Effective Dates Discipline    EM Dusty Brannon, PTA 08/11/15 -  PT               Rehab Goal Summary     Row Name 11/08/19 0818             Physical Therapy Goals    Bed Mobility Goal Selection (PT)  bed mobility, PT goal 1  -EM      Transfer Goal Selection (PT)  transfer, PT goal 1  -EM      Gait Training Goal Selection (PT)  gait training, PT goal 1  -EM      Stairs Goal Selection (PT)  stairs, PT goal 1  -EM         Bed Mobility Goal 1 (PT)    Activity/Assistive Device (Bed Mobility Goal 1, PT)  sit to supine/supine to sit  -EM      [place] Level/Cues Needed (Bed Mobility Goal 1, PT)  conditional independence  -EM      Time Frame (Bed Mobility Goal 1, PT)  long term goal (LTG);by discharge  -EM      Progress/Outcomes (Bed Mobility Goal 1, PT)  goal not met  -EM         Transfer Goal 1 (PT)    Activity/Assistive Device (Transfer Goal 1, PT)  sit-to-stand/stand-to-sit;bed-to-chair/chair-to-bed  -EM      [place] Level/Cues Needed (Transfer Goal 1, PT)  conditional independence  -EM      Time Frame (Transfer Goal 1, PT)  long term goal (LTG);by discharge  -EM      Progress/Outcome (Transfer Goal 1, PT)  goal not met  -EM         Gait Training Goal 1 (PT)    Activity/Assistive Device (Gait Training Goal 1, PT)  walker, rolling  -EM      [place] Level (Gait Training Goal 1, PT)  conditional independence  -EM      Distance (Gait Goal 1, PT)  150'x2  -EM      Time Frame (Gait Training Goal 1, PT)  long term goal (LTG);by discharge  -EM      Barriers (Gait Training Goal 1, PT)  Maintain SpO2 >90%  -EM      Progress/Outcome (Gait Training Goal 1, PT)  goal not met  -EM         Stairs Goal 1 (PT)    Activity/Assistive Device (Stairs Goal 1,  PT)  walker, rolling  -EM      [place] Level/Cues Needed (Stairs Goal 1, PT)  supervision required  -EM      Number of Stairs (Stairs Goal 1, PT)  1 step, no railing.   -EM      Time Frame (Stairs Goal 1, PT)  long term goal (LTG);by discharge  -EM      Barriers (Stairs Goal 1, PT)  Maintain SpO2 >90%  -EM      Progress/Outcome (Stairs Goal 1, PT)  goal not met  -EM        User Key  (r) = Recorded By, (t) = Taken By, (c) = Cosigned By    Initials Name Provider Type Discipline    EM Dusty Brannon, PTA Physical Therapy Assistant PT          Physical Therapy Education     Title: PT OT SLP Therapies (In Progress)     Topic: Physical Therapy (In Progress)     Point: Mobility training (Done)     Learning Progress Summary           Patient Acceptance, E, ERAN,NR by  at 11/7/2019  4:08 PM    Comment:  Educated on proper hand placement with transfers, proper use of walker, benefits of upright posture with gait.                               User Key     Initials Effective Dates Name Provider Type Discipline     04/03/18 -  Jerome Garza, PT Physical Therapist PT                PT Recommendation and Plan  Anticipated Discharge Disposition (PT): anticipate therapy at next level of care  Outcome Summary/Treatment Plan (PT)  Plan for Continued Treatment (PT): Cont current POC, progress as ivania. Watch BP.  Anticipated Discharge Disposition (PT): anticipate therapy at next level of care  Outcome Summary: Pt ivania tx well but gt was deferred by staff due to dropping BP with position changes and static standing. Pt instead performed B LE therex at EOB. Pt t/f sup-sit-sup  SBAx1, sit-stand-sit SBAx1.  Outcome Measures     Row Name 11/08/19 1100 11/07/19 1000          How much help from another person do you currently need...    Turning from your back to your side while in flat bed without using bedrails?  3  -EM  3  -CZ     Moving from lying on back to sitting on the side of a flat bed without bedrails?  3  -EM  3  -CZ      Moving to and from a bed to a chair (including a wheelchair)?  3  -EM  3  -CZ     Standing up from a chair using your arms (e.g., wheelchair, bedside chair)?  3  -EM  3  -CZ     Climbing 3-5 steps with a railing?  3  -EM  3  -CZ     To walk in hospital room?  3  -EM  3  -CZ     AM-PAC 6 Clicks Score (PT)  18  -EM  18  -CZ        Functional Assessment    Outcome Measure Options  AM-PAC 6 Clicks Basic Mobility (PT)  -EM  AM-PAC 6 Clicks Basic Mobility (PT)  -CZ       User Key  (r) = Recorded By, (t) = Taken By, (c) = Cosigned By    Initials Name Provider Type    EM Dusty Brannon PTA Physical Therapy Assistant    CZ Jerome Garza, PT Physical Therapist         Time Calculation:   PT Charges     Row Name 11/08/19 1149             Time Calculation    Start Time  0855  -EM      Stop Time  0925  -EM      Time Calculation (min)  30 min  -EM         Time Calculation- PT    Total Timed Code Minutes- PT  30 minute(s)  -EM        User Key  (r) = Recorded By, (t) = Taken By, (c) = Cosigned By    Initials Name Provider Type     Dusty Brannon PTA Physical Therapy Assistant        Therapy Charges for Today     Code Description Service Date Service Provider Modifiers Qty    54780174811 HC PT THER PROC EA 15 MIN 11/8/2019 Dusty Brannon PTA GP 1    09529422427 HC PT THERAPEUTIC ACT EA 15 MIN 11/8/2019 Dusty Brannon PTA GP 1          PT G-Codes  Outcome Measure Options: AM-PAC 6 Clicks Basic Mobility (PT)  AM-PAC 6 Clicks Score (PT): 18    Dusty Brannon PTA  11/8/2019

## 2019-11-09 NOTE — OUTREACH NOTE
Prep Survey      Responses   Facility patient discharged from?  [facility]   Is patient eligible?  Yes   Discharge diagnosis  NSTEMI, heart cath   Does the patient have one of the following disease processes/diagnoses(primary or secondary)?  Acute MI (STEMI,NSTEMI)   Does the patient have Home health ordered?  Yes   What is the Home health agency?   [facility]   Is there a DME ordered?  No   Comments regarding appointments  see AVS   Medication alerts for this patient  plavix, pepcid, toprol, xarelto   Prep survey completed?  Yes          Martita Cardenas RN

## 2019-11-13 NOTE — OUTREACH NOTE
AMI Week 1 Survey      Responses   Facility patient discharged from?  [facility]   Does the patient have one of the following disease processes/diagnoses(primary or secondary)?  Acute MI (STEMI,NSTEMI)   Is there a successful TCM telephone encounter documented?  No   Week 1 attempt successful?  No   Rescheduled  Revoked [No answer after asking to reschedule. ]          Slick Adhikari RN

## 2020-01-01 ENCOUNTER — APPOINTMENT (OUTPATIENT)
Dept: INTERVENTIONAL RADIOLOGY/VASCULAR | Facility: HOSPITAL | Age: 80
End: 2020-01-01

## 2020-01-01 ENCOUNTER — HOSPITAL ENCOUNTER (OUTPATIENT)
Facility: HOSPITAL | Age: 80
End: 2020-01-28
Attending: INTERNAL MEDICINE | Admitting: INTERNAL MEDICINE

## 2020-01-01 ENCOUNTER — APPOINTMENT (OUTPATIENT)
Dept: GENERAL RADIOLOGY | Facility: HOSPITAL | Age: 80
End: 2020-01-01

## 2020-01-01 ENCOUNTER — APPOINTMENT (OUTPATIENT)
Dept: ULTRASOUND IMAGING | Facility: HOSPITAL | Age: 80
End: 2020-01-01

## 2020-01-01 ENCOUNTER — APPOINTMENT (OUTPATIENT)
Dept: CARDIOLOGY | Facility: HOSPITAL | Age: 80
End: 2020-01-01

## 2020-01-01 ENCOUNTER — APPOINTMENT (OUTPATIENT)
Dept: NUCLEAR MEDICINE | Facility: HOSPITAL | Age: 80
End: 2020-01-01

## 2020-01-01 ENCOUNTER — APPOINTMENT (OUTPATIENT)
Dept: CT IMAGING | Facility: HOSPITAL | Age: 80
End: 2020-01-01

## 2020-01-01 ENCOUNTER — HOSPITAL ENCOUNTER (INPATIENT)
Facility: HOSPITAL | Age: 80
LOS: 6 days | Discharge: NURSING, REHAB OR LONG-TERM CARE | End: 2020-01-27
Attending: HOSPITALIST | Admitting: HOSPITALIST

## 2020-01-01 VITALS
HEART RATE: 84 BPM | BODY MASS INDEX: 25.92 KG/M2 | WEIGHT: 151.8 LBS | DIASTOLIC BLOOD PRESSURE: 69 MMHG | HEIGHT: 64 IN | SYSTOLIC BLOOD PRESSURE: 98 MMHG | OXYGEN SATURATION: 94 % | TEMPERATURE: 96.4 F | RESPIRATION RATE: 18 BRPM

## 2020-01-01 DIAGNOSIS — I26.99 OTHER ACUTE PULMONARY EMBOLISM WITHOUT ACUTE COR PULMONALE (HCC): Primary | ICD-10-CM

## 2020-01-01 DIAGNOSIS — Z74.09 IMPAIRED FUNCTIONAL MOBILITY, BALANCE, GAIT, AND ENDURANCE: ICD-10-CM

## 2020-01-01 DIAGNOSIS — Z78.9 IMPAIRED MOBILITY AND ACTIVITIES OF DAILY LIVING: ICD-10-CM

## 2020-01-01 DIAGNOSIS — Z74.09 IMPAIRED MOBILITY AND ACTIVITIES OF DAILY LIVING: ICD-10-CM

## 2020-01-01 LAB
ALBUMIN SERPL-MCNC: 2.8 G/DL (ref 3.5–5.2)
ALBUMIN SERPL-MCNC: 3 G/DL (ref 3.5–5.2)
ALBUMIN SERPL-MCNC: 3.1 G/DL (ref 3.5–5.2)
ALBUMIN SERPL-MCNC: 3.2 G/DL (ref 3.5–5.2)
ALBUMIN SERPL-MCNC: 3.3 G/DL (ref 3.5–5.2)
ALBUMIN SERPL-MCNC: 3.9 G/DL (ref 3.5–5.2)
ALBUMIN SERPL-MCNC: 4.3 G/DL (ref 3.5–5.2)
ALBUMIN SERPL-MCNC: 4.3 G/DL (ref 3.5–5.2)
ALBUMIN/GLOB SERPL: 1.1 G/DL
ALBUMIN/GLOB SERPL: 1.2 G/DL
ALBUMIN/GLOB SERPL: 1.8 G/DL
ALBUMIN/GLOB SERPL: 2.3 G/DL
ALBUMIN/GLOB SERPL: 2.3 G/DL
ALP SERPL-CCNC: 126 U/L (ref 39–117)
ALP SERPL-CCNC: 128 U/L (ref 39–117)
ALP SERPL-CCNC: 146 U/L (ref 39–117)
ALP SERPL-CCNC: 163 U/L (ref 39–117)
ALP SERPL-CCNC: 177 U/L (ref 39–117)
ALP SERPL-CCNC: 179 U/L (ref 39–117)
ALP SERPL-CCNC: 179 U/L (ref 39–117)
ALP SERPL-CCNC: 183 U/L (ref 39–117)
ALP SERPL-CCNC: 205 U/L (ref 39–117)
ALP SERPL-CCNC: 85 U/L (ref 39–117)
ALT SERPL W P-5'-P-CCNC: 13 U/L (ref 1–33)
ALT SERPL W P-5'-P-CCNC: 25 U/L (ref 1–33)
ALT SERPL W P-5'-P-CCNC: 27 U/L (ref 1–33)
ALT SERPL W P-5'-P-CCNC: 28 U/L (ref 1–33)
ALT SERPL W P-5'-P-CCNC: 30 U/L (ref 1–33)
ALT SERPL W P-5'-P-CCNC: 31 U/L (ref 1–33)
ALT SERPL W P-5'-P-CCNC: 32 U/L (ref 1–33)
ALT SERPL W P-5'-P-CCNC: 39 U/L (ref 1–33)
ANION GAP SERPL CALCULATED.3IONS-SCNC: 15 MMOL/L (ref 5–15)
ANION GAP SERPL CALCULATED.3IONS-SCNC: 16 MMOL/L (ref 5–15)
ANION GAP SERPL CALCULATED.3IONS-SCNC: 17 MMOL/L (ref 5–15)
ANION GAP SERPL CALCULATED.3IONS-SCNC: 17 MMOL/L (ref 5–15)
ANION GAP SERPL CALCULATED.3IONS-SCNC: 18 MMOL/L (ref 5–15)
ANION GAP SERPL CALCULATED.3IONS-SCNC: 19 MMOL/L (ref 5–15)
ANION GAP SERPL CALCULATED.3IONS-SCNC: 19 MMOL/L (ref 5–15)
ANION GAP SERPL CALCULATED.3IONS-SCNC: 21 MMOL/L (ref 5–15)
ANISOCYTOSIS BLD QL: ABNORMAL
ANISOCYTOSIS BLD QL: ABNORMAL
APTT PPP: 123.1 SECONDS (ref 20–40.3)
APTT PPP: 191.2 SECONDS (ref 20–40.3)
APTT PPP: 193.4 SECONDS (ref 20–40.3)
APTT PPP: 38.5 SECONDS (ref 20–40.3)
APTT PPP: 39.6 SECONDS (ref 20–40.3)
APTT PPP: >240 SECONDS (ref 20–40.3)
ARTERIAL PATENCY WRIST A: POSITIVE
AST SERPL-CCNC: 112 U/L (ref 1–32)
AST SERPL-CCNC: 42 U/L (ref 1–32)
AST SERPL-CCNC: 68 U/L (ref 1–32)
AST SERPL-CCNC: 72 U/L (ref 1–32)
AST SERPL-CCNC: 76 U/L (ref 1–32)
AST SERPL-CCNC: 77 U/L (ref 1–32)
AST SERPL-CCNC: 82 U/L (ref 1–32)
AST SERPL-CCNC: 92 U/L (ref 1–32)
AST SERPL-CCNC: 95 U/L (ref 1–32)
AST SERPL-CCNC: 96 U/L (ref 1–32)
ATMOSPHERIC PRESS: 750 MMHG
BACTERIA UR QL AUTO: ABNORMAL /HPF
BASE EXCESS BLDA CALC-SCNC: -9.8 MMOL/L (ref 0–2)
BASOPHILS # BLD AUTO: 0.02 10*3/MM3 (ref 0–0.2)
BASOPHILS # BLD AUTO: 0.02 10*3/MM3 (ref 0–0.2)
BASOPHILS # BLD AUTO: 0.03 10*3/MM3 (ref 0–0.2)
BASOPHILS # BLD AUTO: 0.04 10*3/MM3 (ref 0–0.2)
BASOPHILS # BLD MANUAL: 0.07 10*3/MM3 (ref 0–0.2)
BASOPHILS NFR BLD AUTO: 0.4 % (ref 0–1.5)
BASOPHILS NFR BLD AUTO: 0.5 % (ref 0–1.5)
BASOPHILS NFR BLD AUTO: 0.5 % (ref 0–1.5)
BASOPHILS NFR BLD AUTO: 0.6 % (ref 0–1.5)
BASOPHILS NFR BLD AUTO: 0.7 % (ref 0–1.5)
BASOPHILS NFR BLD AUTO: 0.8 % (ref 0–1.5)
BASOPHILS NFR BLD AUTO: 0.8 % (ref 0–1.5)
BASOPHILS NFR BLD AUTO: 1 % (ref 0–1.5)
BDY SITE: ABNORMAL
BH CV ECHO MEAS - BSA(HAYCOCK): 1.8 M^2
BH CV ECHO MEAS - BSA: 1.7 M^2
BH CV ECHO MEAS - BZI_BMI: 25.7 KILOGRAMS/M^2
BH CV ECHO MEAS - BZI_METRIC_HEIGHT: 162.6 CM
BH CV ECHO MEAS - BZI_METRIC_WEIGHT: 68 KG
BH CV ECHO MEAS - EDV(CUBED): 22.4 ML
BH CV ECHO MEAS - EDV(TEICH): 30.1 ML
BH CV ECHO MEAS - EF(CUBED): 88.3 %
BH CV ECHO MEAS - EF(TEICH): 83.8 %
BH CV ECHO MEAS - ESV(CUBED): 2.6 ML
BH CV ECHO MEAS - ESV(TEICH): 4.9 ML
BH CV ECHO MEAS - FS: 51.1 %
BH CV ECHO MEAS - IVS/LVPW: 1.5
BH CV ECHO MEAS - IVSD: 1.3 CM
BH CV ECHO MEAS - LV MASS(C)D: 89.3 GRAMS
BH CV ECHO MEAS - LV MASS(C)DI: 51.6 GRAMS/M^2
BH CV ECHO MEAS - LVIDD: 2.8 CM
BH CV ECHO MEAS - LVIDS: 1.4 CM
BH CV ECHO MEAS - LVPWD: 0.89 CM
BH CV ECHO MEAS - RVDD: 3.7 CM
BH CV ECHO MEAS - SI(CUBED): 11.4 ML/M^2
BH CV ECHO MEAS - SI(TEICH): 14.6 ML/M^2
BH CV ECHO MEAS - SV(CUBED): 19.8 ML
BH CV ECHO MEAS - SV(TEICH): 25.2 ML
BH CV ECHO MEAS - TR MAX VEL: 431 CM/SEC
BILIRUB SERPL-MCNC: 1.8 MG/DL (ref 0.2–1.2)
BILIRUB SERPL-MCNC: 1.9 MG/DL (ref 0.2–1.2)
BILIRUB SERPL-MCNC: 2.2 MG/DL (ref 0.2–1.2)
BILIRUB SERPL-MCNC: 2.3 MG/DL (ref 0.2–1.2)
BILIRUB SERPL-MCNC: 2.4 MG/DL (ref 0.2–1.2)
BILIRUB SERPL-MCNC: 3.2 MG/DL (ref 0.2–1.2)
BILIRUB UR QL STRIP: NEGATIVE
BUN BLD-MCNC: 10 MG/DL (ref 8–23)
BUN BLD-MCNC: 10 MG/DL (ref 8–23)
BUN BLD-MCNC: 11 MG/DL (ref 8–23)
BUN BLD-MCNC: 12 MG/DL (ref 8–23)
BUN BLD-MCNC: 13 MG/DL (ref 8–23)
BUN BLD-MCNC: 14 MG/DL (ref 8–23)
BUN BLD-MCNC: 14 MG/DL (ref 8–23)
BUN BLD-MCNC: 16 MG/DL (ref 8–23)
BUN BLD-MCNC: 8 MG/DL (ref 8–23)
BUN BLD-MCNC: 8 MG/DL (ref 8–23)
BUN/CREAT SERPL: 10.2 (ref 7–25)
BUN/CREAT SERPL: 10.3 (ref 7–25)
BUN/CREAT SERPL: 10.9 (ref 7–25)
BUN/CREAT SERPL: 11.1 (ref 7–25)
BUN/CREAT SERPL: 12 (ref 7–25)
BUN/CREAT SERPL: 12.6 (ref 7–25)
BUN/CREAT SERPL: 14.3 (ref 7–25)
BUN/CREAT SERPL: 14.7 (ref 7–25)
BUN/CREAT SERPL: 8.5 (ref 7–25)
BUN/CREAT SERPL: 9.2 (ref 7–25)
CALCIUM SPEC-SCNC: 10.2 MG/DL (ref 8.6–10.5)
CALCIUM SPEC-SCNC: 10.3 MG/DL (ref 8.6–10.5)
CALCIUM SPEC-SCNC: 8.4 MG/DL (ref 8.6–10.5)
CALCIUM SPEC-SCNC: 9 MG/DL (ref 8.6–10.5)
CALCIUM SPEC-SCNC: 9.1 MG/DL (ref 8.6–10.5)
CALCIUM SPEC-SCNC: 9.1 MG/DL (ref 8.6–10.5)
CALCIUM SPEC-SCNC: 9.2 MG/DL (ref 8.6–10.5)
CALCIUM SPEC-SCNC: 9.3 MG/DL (ref 8.6–10.5)
CALCIUM SPEC-SCNC: 9.3 MG/DL (ref 8.6–10.5)
CALCIUM SPEC-SCNC: 9.7 MG/DL (ref 8.6–10.5)
CHLORIDE SERPL-SCNC: 100 MMOL/L (ref 98–107)
CHLORIDE SERPL-SCNC: 100 MMOL/L (ref 98–107)
CHLORIDE SERPL-SCNC: 94 MMOL/L (ref 98–107)
CHLORIDE SERPL-SCNC: 95 MMOL/L (ref 98–107)
CHLORIDE SERPL-SCNC: 97 MMOL/L (ref 98–107)
CHLORIDE SERPL-SCNC: 98 MMOL/L (ref 98–107)
CHLORIDE SERPL-SCNC: 99 MMOL/L (ref 98–107)
CHOLEST SERPL-MCNC: 97 MG/DL (ref 0–200)
CK MB SERPL-CCNC: 3.56 NG/ML
CK SERPL-CCNC: 84 U/L (ref 20–180)
CLARITY UR: ABNORMAL
CO2 SERPL-SCNC: 16 MMOL/L (ref 22–29)
CO2 SERPL-SCNC: 17 MMOL/L (ref 22–29)
CO2 SERPL-SCNC: 19 MMOL/L (ref 22–29)
CO2 SERPL-SCNC: 19 MMOL/L (ref 22–29)
CO2 SERPL-SCNC: 20 MMOL/L (ref 22–29)
CO2 SERPL-SCNC: 20 MMOL/L (ref 22–29)
CO2 SERPL-SCNC: 21 MMOL/L (ref 22–29)
CO2 SERPL-SCNC: 21 MMOL/L (ref 22–29)
CO2 SERPL-SCNC: 22 MMOL/L (ref 22–29)
CO2 SERPL-SCNC: 23 MMOL/L (ref 22–29)
COLOR UR: ABNORMAL
CREAT BLD-MCNC: 0.68 MG/DL (ref 0.57–1)
CREAT BLD-MCNC: 0.87 MG/DL (ref 0.57–1)
CREAT BLD-MCNC: 0.94 MG/DL (ref 0.57–1)
CREAT BLD-MCNC: 0.97 MG/DL (ref 0.57–1)
CREAT BLD-MCNC: 1.08 MG/DL (ref 0.57–1)
CREAT BLD-MCNC: 1.1 MG/DL (ref 0.57–1)
CREAT BLD-MCNC: 1.11 MG/DL (ref 0.57–1)
CREAT BLD-MCNC: 1.12 MG/DL (ref 0.57–1)
CREAT BLD-MCNC: 1.17 MG/DL (ref 0.57–1)
CREAT BLD-MCNC: 1.17 MG/DL (ref 0.57–1)
D-LACTATE SERPL-SCNC: 1.9 MMOL/L (ref 0.5–2)
DEPRECATED RDW RBC AUTO: 42.3 FL (ref 37–54)
DEPRECATED RDW RBC AUTO: 42.4 FL (ref 37–54)
DEPRECATED RDW RBC AUTO: 42.5 FL (ref 37–54)
DEPRECATED RDW RBC AUTO: 42.7 FL (ref 37–54)
DEPRECATED RDW RBC AUTO: 43 FL (ref 37–54)
DEPRECATED RDW RBC AUTO: 43.5 FL (ref 37–54)
DEPRECATED RDW RBC AUTO: 43.5 FL (ref 37–54)
DEPRECATED RDW RBC AUTO: 43.8 FL (ref 37–54)
DEPRECATED RDW RBC AUTO: 43.8 FL (ref 37–54)
DEPRECATED RDW RBC AUTO: 44.2 FL (ref 37–54)
DEPRECATED RDW RBC AUTO: 44.3 FL (ref 37–54)
DEPRECATED RDW RBC AUTO: 44.4 FL (ref 37–54)
DEPRECATED RDW RBC AUTO: 44.8 FL (ref 37–54)
EOSINOPHIL # BLD AUTO: 0.02 10*3/MM3 (ref 0–0.4)
EOSINOPHIL # BLD AUTO: 0.03 10*3/MM3 (ref 0–0.4)
EOSINOPHIL # BLD AUTO: 0.03 10*3/MM3 (ref 0–0.4)
EOSINOPHIL # BLD AUTO: 0.04 10*3/MM3 (ref 0–0.4)
EOSINOPHIL # BLD AUTO: 0.07 10*3/MM3 (ref 0–0.4)
EOSINOPHIL # BLD AUTO: 0.07 10*3/MM3 (ref 0–0.4)
EOSINOPHIL # BLD AUTO: 0.08 10*3/MM3 (ref 0–0.4)
EOSINOPHIL # BLD AUTO: 0.09 10*3/MM3 (ref 0–0.4)
EOSINOPHIL # BLD MANUAL: 0.07 10*3/MM3 (ref 0–0.4)
EOSINOPHIL # BLD MANUAL: 0.11 10*3/MM3 (ref 0–0.4)
EOSINOPHIL # BLD MANUAL: 0.21 10*3/MM3 (ref 0–0.4)
EOSINOPHIL NFR BLD AUTO: 0.4 % (ref 0.3–6.2)
EOSINOPHIL NFR BLD AUTO: 0.6 % (ref 0.3–6.2)
EOSINOPHIL NFR BLD AUTO: 0.8 % (ref 0.3–6.2)
EOSINOPHIL NFR BLD AUTO: 0.8 % (ref 0.3–6.2)
EOSINOPHIL NFR BLD AUTO: 1.2 % (ref 0.3–6.2)
EOSINOPHIL NFR BLD AUTO: 1.4 % (ref 0.3–6.2)
EOSINOPHIL NFR BLD AUTO: 1.6 % (ref 0.3–6.2)
EOSINOPHIL NFR BLD AUTO: 1.8 % (ref 0.3–6.2)
EOSINOPHIL NFR BLD MANUAL: 1 % (ref 0.3–6.2)
EOSINOPHIL NFR BLD MANUAL: 2 % (ref 0.3–6.2)
EOSINOPHIL NFR BLD MANUAL: 4 % (ref 0.3–6.2)
ERYTHROCYTE [DISTWIDTH] IN BLOOD BY AUTOMATED COUNT: 16.9 % (ref 12.3–15.4)
ERYTHROCYTE [DISTWIDTH] IN BLOOD BY AUTOMATED COUNT: 16.9 % (ref 12.3–15.4)
ERYTHROCYTE [DISTWIDTH] IN BLOOD BY AUTOMATED COUNT: 17 % (ref 12.3–15.4)
ERYTHROCYTE [DISTWIDTH] IN BLOOD BY AUTOMATED COUNT: 17.2 % (ref 12.3–15.4)
ERYTHROCYTE [DISTWIDTH] IN BLOOD BY AUTOMATED COUNT: 17.4 % (ref 12.3–15.4)
ERYTHROCYTE [DISTWIDTH] IN BLOOD BY AUTOMATED COUNT: 17.4 % (ref 12.3–15.4)
ERYTHROCYTE [DISTWIDTH] IN BLOOD BY AUTOMATED COUNT: 17.5 % (ref 12.3–15.4)
ERYTHROCYTE [DISTWIDTH] IN BLOOD BY AUTOMATED COUNT: 18 % (ref 12.3–15.4)
ERYTHROCYTE [DISTWIDTH] IN BLOOD BY AUTOMATED COUNT: 18 % (ref 12.3–15.4)
ERYTHROCYTE [DISTWIDTH] IN BLOOD BY AUTOMATED COUNT: 18.2 % (ref 12.3–15.4)
ERYTHROCYTE [DISTWIDTH] IN BLOOD BY AUTOMATED COUNT: 18.3 % (ref 12.3–15.4)
GFR SERPL CREATININE-BSD FRML MDRD: 101 ML/MIN/1.73
GFR SERPL CREATININE-BSD FRML MDRD: 54 ML/MIN/1.73
GFR SERPL CREATININE-BSD FRML MDRD: 54 ML/MIN/1.73
GFR SERPL CREATININE-BSD FRML MDRD: 57 ML/MIN/1.73
GFR SERPL CREATININE-BSD FRML MDRD: 57 ML/MIN/1.73
GFR SERPL CREATININE-BSD FRML MDRD: 58 ML/MIN/1.73
GFR SERPL CREATININE-BSD FRML MDRD: 59 ML/MIN/1.73
GFR SERPL CREATININE-BSD FRML MDRD: 67 ML/MIN/1.73
GFR SERPL CREATININE-BSD FRML MDRD: 69 ML/MIN/1.73
GFR SERPL CREATININE-BSD FRML MDRD: 76 ML/MIN/1.73
GLOBULIN UR ELPH-MCNC: 1.9 GM/DL
GLOBULIN UR ELPH-MCNC: 1.9 GM/DL
GLOBULIN UR ELPH-MCNC: 2.2 GM/DL
GLOBULIN UR ELPH-MCNC: 2.4 GM/DL
GLOBULIN UR ELPH-MCNC: 2.7 GM/DL
GLOBULIN UR ELPH-MCNC: 2.8 GM/DL
GLUCOSE BLD-MCNC: 107 MG/DL (ref 65–99)
GLUCOSE BLD-MCNC: 148 MG/DL (ref 65–99)
GLUCOSE BLD-MCNC: 45 MG/DL (ref 65–99)
GLUCOSE BLD-MCNC: 51 MG/DL (ref 65–99)
GLUCOSE BLD-MCNC: 73 MG/DL (ref 65–99)
GLUCOSE BLD-MCNC: 80 MG/DL (ref 65–99)
GLUCOSE BLD-MCNC: 86 MG/DL (ref 65–99)
GLUCOSE BLD-MCNC: 88 MG/DL (ref 65–99)
GLUCOSE BLD-MCNC: 94 MG/DL (ref 65–99)
GLUCOSE BLD-MCNC: 98 MG/DL (ref 65–99)
GLUCOSE BLDC GLUCOMTR-MCNC: 108 MG/DL (ref 70–130)
GLUCOSE BLDC GLUCOMTR-MCNC: 114 MG/DL (ref 70–130)
GLUCOSE BLDC GLUCOMTR-MCNC: 116 MG/DL (ref 70–130)
GLUCOSE BLDC GLUCOMTR-MCNC: 117 MG/DL (ref 70–130)
GLUCOSE BLDC GLUCOMTR-MCNC: 118 MG/DL (ref 70–130)
GLUCOSE BLDC GLUCOMTR-MCNC: 123 MG/DL (ref 70–130)
GLUCOSE BLDC GLUCOMTR-MCNC: 123 MG/DL (ref 70–130)
GLUCOSE BLDC GLUCOMTR-MCNC: 128 MG/DL (ref 70–130)
GLUCOSE BLDC GLUCOMTR-MCNC: 130 MG/DL (ref 70–130)
GLUCOSE BLDC GLUCOMTR-MCNC: 132 MG/DL (ref 70–130)
GLUCOSE BLDC GLUCOMTR-MCNC: 143 MG/DL (ref 70–130)
GLUCOSE BLDC GLUCOMTR-MCNC: 150 MG/DL (ref 70–130)
GLUCOSE BLDC GLUCOMTR-MCNC: 154 MG/DL (ref 70–130)
GLUCOSE BLDC GLUCOMTR-MCNC: 156 MG/DL (ref 70–130)
GLUCOSE BLDC GLUCOMTR-MCNC: 168 MG/DL (ref 70–130)
GLUCOSE BLDC GLUCOMTR-MCNC: 176 MG/DL (ref 70–130)
GLUCOSE BLDC GLUCOMTR-MCNC: 182 MG/DL (ref 70–130)
GLUCOSE BLDC GLUCOMTR-MCNC: 45 MG/DL (ref 70–130)
GLUCOSE BLDC GLUCOMTR-MCNC: 45 MG/DL (ref 70–130)
GLUCOSE BLDC GLUCOMTR-MCNC: 48 MG/DL (ref 70–130)
GLUCOSE BLDC GLUCOMTR-MCNC: 48 MG/DL (ref 70–130)
GLUCOSE BLDC GLUCOMTR-MCNC: 50 MG/DL (ref 70–130)
GLUCOSE BLDC GLUCOMTR-MCNC: 53 MG/DL (ref 70–130)
GLUCOSE BLDC GLUCOMTR-MCNC: 55 MG/DL (ref 70–130)
GLUCOSE BLDC GLUCOMTR-MCNC: 60 MG/DL (ref 70–130)
GLUCOSE BLDC GLUCOMTR-MCNC: 60 MG/DL (ref 70–130)
GLUCOSE BLDC GLUCOMTR-MCNC: 62 MG/DL (ref 70–130)
GLUCOSE BLDC GLUCOMTR-MCNC: 63 MG/DL (ref 70–130)
GLUCOSE BLDC GLUCOMTR-MCNC: 64 MG/DL (ref 70–130)
GLUCOSE BLDC GLUCOMTR-MCNC: 68 MG/DL (ref 70–130)
GLUCOSE BLDC GLUCOMTR-MCNC: 69 MG/DL (ref 70–130)
GLUCOSE BLDC GLUCOMTR-MCNC: 72 MG/DL (ref 70–130)
GLUCOSE BLDC GLUCOMTR-MCNC: 73 MG/DL (ref 70–130)
GLUCOSE BLDC GLUCOMTR-MCNC: 74 MG/DL (ref 70–130)
GLUCOSE BLDC GLUCOMTR-MCNC: 76 MG/DL (ref 70–130)
GLUCOSE BLDC GLUCOMTR-MCNC: 77 MG/DL (ref 70–130)
GLUCOSE BLDC GLUCOMTR-MCNC: 77 MG/DL (ref 70–130)
GLUCOSE BLDC GLUCOMTR-MCNC: 82 MG/DL (ref 70–130)
GLUCOSE BLDC GLUCOMTR-MCNC: 90 MG/DL (ref 70–130)
GLUCOSE BLDC GLUCOMTR-MCNC: 92 MG/DL (ref 70–130)
GLUCOSE BLDC GLUCOMTR-MCNC: 92 MG/DL (ref 70–130)
GLUCOSE BLDC GLUCOMTR-MCNC: 93 MG/DL (ref 70–130)
GLUCOSE BLDC GLUCOMTR-MCNC: 93 MG/DL (ref 70–130)
GLUCOSE BLDC GLUCOMTR-MCNC: 94 MG/DL (ref 70–130)
GLUCOSE BLDC GLUCOMTR-MCNC: 99 MG/DL (ref 70–130)
GLUCOSE UR STRIP-MCNC: NEGATIVE MG/DL
HBA1C MFR BLD: 5.3 % (ref 4.8–5.6)
HCO3 BLDA-SCNC: 14.5 MMOL/L (ref 20–26)
HCT VFR BLD AUTO: 34.8 % (ref 34–46.6)
HCT VFR BLD AUTO: 35.5 % (ref 34–46.6)
HCT VFR BLD AUTO: 35.8 % (ref 34–46.6)
HCT VFR BLD AUTO: 36.2 % (ref 34–46.6)
HCT VFR BLD AUTO: 36.8 % (ref 34–46.6)
HCT VFR BLD AUTO: 37.7 % (ref 34–46.6)
HCT VFR BLD AUTO: 38.2 % (ref 34–46.6)
HCT VFR BLD AUTO: 38.5 % (ref 34–46.6)
HCT VFR BLD AUTO: 39 % (ref 34–46.6)
HCT VFR BLD AUTO: 39 % (ref 34–46.6)
HCT VFR BLD AUTO: 39.9 % (ref 34–46.6)
HCT VFR BLD AUTO: 40.2 % (ref 34–46.6)
HCT VFR BLD AUTO: 43 % (ref 34–46.6)
HDLC SERPL-MCNC: 14 MG/DL (ref 40–60)
HGB BLD-MCNC: 13 G/DL (ref 12–15.9)
HGB BLD-MCNC: 13.2 G/DL (ref 12–15.9)
HGB BLD-MCNC: 13.2 G/DL (ref 12–15.9)
HGB BLD-MCNC: 13.3 G/DL (ref 12–15.9)
HGB BLD-MCNC: 13.5 G/DL (ref 12–15.9)
HGB BLD-MCNC: 13.6 G/DL (ref 12–15.9)
HGB BLD-MCNC: 14 G/DL (ref 12–15.9)
HGB BLD-MCNC: 14.1 G/DL (ref 12–15.9)
HGB BLD-MCNC: 14.5 G/DL (ref 12–15.9)
HGB BLD-MCNC: 15.5 G/DL (ref 12–15.9)
HGB UR QL STRIP.AUTO: ABNORMAL
HOLD SPECIMEN: NORMAL
HOROWITZ INDEX BLD+IHG-RTO: <21 %
HYALINE CASTS UR QL AUTO: ABNORMAL /LPF
HYPOCHROMIA BLD QL: ABNORMAL
IMM GRANULOCYTES # BLD AUTO: 0.01 10*3/MM3 (ref 0–0.05)
IMM GRANULOCYTES # BLD AUTO: 0.01 10*3/MM3 (ref 0–0.05)
IMM GRANULOCYTES # BLD AUTO: 0.02 10*3/MM3 (ref 0–0.05)
IMM GRANULOCYTES # BLD AUTO: 0.03 10*3/MM3 (ref 0–0.05)
IMM GRANULOCYTES NFR BLD AUTO: 0.2 % (ref 0–0.5)
IMM GRANULOCYTES NFR BLD AUTO: 0.3 % (ref 0–0.5)
IMM GRANULOCYTES NFR BLD AUTO: 0.4 % (ref 0–0.5)
IMM GRANULOCYTES NFR BLD AUTO: 0.5 % (ref 0–0.5)
IMM GRANULOCYTES NFR BLD AUTO: 0.5 % (ref 0–0.5)
IMM GRANULOCYTES NFR BLD AUTO: 0.6 % (ref 0–0.5)
INR PPP: 1.63 (ref 0.8–1.2)
INR PPP: 2.13 (ref 0.8–1.2)
INR PPP: 2.33 (ref 0.8–1.2)
KETONES UR QL STRIP: NEGATIVE
LDLC SERPL CALC-MCNC: 66 MG/DL (ref 0–100)
LDLC/HDLC SERPL: 4.73 {RATIO}
LEUKOCYTE ESTERASE UR QL STRIP.AUTO: ABNORMAL
LIPASE SERPL-CCNC: 8 U/L (ref 13–60)
LV EF 2D ECHO EST: 61 %
LYMPHOCYTES # BLD AUTO: 1 10*3/MM3 (ref 0.7–3.1)
LYMPHOCYTES # BLD AUTO: 1.13 10*3/MM3 (ref 0.7–3.1)
LYMPHOCYTES # BLD AUTO: 1.16 10*3/MM3 (ref 0.7–3.1)
LYMPHOCYTES # BLD AUTO: 1.26 10*3/MM3 (ref 0.7–3.1)
LYMPHOCYTES # BLD AUTO: 1.4 10*3/MM3 (ref 0.7–3.1)
LYMPHOCYTES # BLD AUTO: 1.43 10*3/MM3 (ref 0.7–3.1)
LYMPHOCYTES # BLD AUTO: 1.56 10*3/MM3 (ref 0.7–3.1)
LYMPHOCYTES # BLD AUTO: 1.62 10*3/MM3 (ref 0.7–3.1)
LYMPHOCYTES # BLD AUTO: 1.64 10*3/MM3 (ref 0.7–3.1)
LYMPHOCYTES # BLD AUTO: 1.75 10*3/MM3 (ref 0.7–3.1)
LYMPHOCYTES # BLD MANUAL: 1.03 10*3/MM3 (ref 0.7–3.1)
LYMPHOCYTES # BLD MANUAL: 1.42 10*3/MM3 (ref 0.7–3.1)
LYMPHOCYTES # BLD MANUAL: 1.76 10*3/MM3 (ref 0.7–3.1)
LYMPHOCYTES NFR BLD AUTO: 21 % (ref 19.6–45.3)
LYMPHOCYTES NFR BLD AUTO: 23.8 % (ref 19.6–45.3)
LYMPHOCYTES NFR BLD AUTO: 25.3 % (ref 19.6–45.3)
LYMPHOCYTES NFR BLD AUTO: 26.3 % (ref 19.6–45.3)
LYMPHOCYTES NFR BLD AUTO: 27.2 % (ref 19.6–45.3)
LYMPHOCYTES NFR BLD AUTO: 28 % (ref 19.6–45.3)
LYMPHOCYTES NFR BLD AUTO: 28.5 % (ref 19.6–45.3)
LYMPHOCYTES NFR BLD AUTO: 31 % (ref 19.6–45.3)
LYMPHOCYTES NFR BLD AUTO: 31.8 % (ref 19.6–45.3)
LYMPHOCYTES NFR BLD AUTO: 32.9 % (ref 19.6–45.3)
LYMPHOCYTES NFR BLD MANUAL: 12 % (ref 5–12)
LYMPHOCYTES NFR BLD MANUAL: 20 % (ref 19.6–45.3)
LYMPHOCYTES NFR BLD MANUAL: 25 % (ref 19.6–45.3)
LYMPHOCYTES NFR BLD MANUAL: 27 % (ref 19.6–45.3)
LYMPHOCYTES NFR BLD MANUAL: 4 % (ref 5–12)
LYMPHOCYTES NFR BLD MANUAL: 7 % (ref 5–12)
Lab: ABNORMAL
MAGNESIUM SERPL-MCNC: 1.3 MG/DL (ref 1.6–2.4)
MAGNESIUM SERPL-MCNC: 1.8 MG/DL (ref 1.6–2.4)
MAGNESIUM SERPL-MCNC: 1.8 MG/DL (ref 1.6–2.4)
MAGNESIUM SERPL-MCNC: 2.1 MG/DL (ref 1.6–2.4)
MCH RBC QN AUTO: 26.2 PG (ref 26.6–33)
MCH RBC QN AUTO: 26.4 PG (ref 26.6–33)
MCH RBC QN AUTO: 26.5 PG (ref 26.6–33)
MCH RBC QN AUTO: 26.5 PG (ref 26.6–33)
MCH RBC QN AUTO: 26.6 PG (ref 26.6–33)
MCH RBC QN AUTO: 26.7 PG (ref 26.6–33)
MCH RBC QN AUTO: 26.7 PG (ref 26.6–33)
MCH RBC QN AUTO: 26.8 PG (ref 26.6–33)
MCH RBC QN AUTO: 26.8 PG (ref 26.6–33)
MCHC RBC AUTO-ENTMCNC: 35.3 G/DL (ref 31.5–35.7)
MCHC RBC AUTO-ENTMCNC: 36 G/DL (ref 31.5–35.7)
MCHC RBC AUTO-ENTMCNC: 36.1 G/DL (ref 31.5–35.7)
MCHC RBC AUTO-ENTMCNC: 36.1 G/DL (ref 31.5–35.7)
MCHC RBC AUTO-ENTMCNC: 36.2 G/DL (ref 31.5–35.7)
MCHC RBC AUTO-ENTMCNC: 36.2 G/DL (ref 31.5–35.7)
MCHC RBC AUTO-ENTMCNC: 36.4 G/DL (ref 31.5–35.7)
MCHC RBC AUTO-ENTMCNC: 36.5 G/DL (ref 31.5–35.7)
MCHC RBC AUTO-ENTMCNC: 36.7 G/DL (ref 31.5–35.7)
MCHC RBC AUTO-ENTMCNC: 36.9 G/DL (ref 31.5–35.7)
MCHC RBC AUTO-ENTMCNC: 37.2 G/DL (ref 31.5–35.7)
MCHC RBC AUTO-ENTMCNC: 37.2 G/DL (ref 31.5–35.7)
MCHC RBC AUTO-ENTMCNC: 37.4 G/DL (ref 31.5–35.7)
MCV RBC AUTO: 71.3 FL (ref 79–97)
MCV RBC AUTO: 71.7 FL (ref 79–97)
MCV RBC AUTO: 72.2 FL (ref 79–97)
MCV RBC AUTO: 72.4 FL (ref 79–97)
MCV RBC AUTO: 72.5 FL (ref 79–97)
MCV RBC AUTO: 72.6 FL (ref 79–97)
MCV RBC AUTO: 73.1 FL (ref 79–97)
MCV RBC AUTO: 73.2 FL (ref 79–97)
MCV RBC AUTO: 73.4 FL (ref 79–97)
MCV RBC AUTO: 73.6 FL (ref 79–97)
MCV RBC AUTO: 73.6 FL (ref 79–97)
MCV RBC AUTO: 73.8 FL (ref 79–97)
MCV RBC AUTO: 74 FL (ref 79–97)
MODALITY: ABNORMAL
MONOCYTES # BLD AUTO: 0.21 10*3/MM3 (ref 0.1–0.9)
MONOCYTES # BLD AUTO: 0.4 10*3/MM3 (ref 0.1–0.9)
MONOCYTES # BLD AUTO: 0.52 10*3/MM3 (ref 0.1–0.9)
MONOCYTES # BLD AUTO: 0.61 10*3/MM3 (ref 0.1–0.9)
MONOCYTES # BLD AUTO: 0.64 10*3/MM3 (ref 0.1–0.9)
MONOCYTES # BLD AUTO: 0.65 10*3/MM3 (ref 0.1–0.9)
MONOCYTES # BLD AUTO: 0.65 10*3/MM3 (ref 0.1–0.9)
MONOCYTES # BLD AUTO: 0.68 10*3/MM3 (ref 0.1–0.9)
MONOCYTES # BLD AUTO: 0.69 10*3/MM3 (ref 0.1–0.9)
MONOCYTES # BLD AUTO: 0.69 10*3/MM3 (ref 0.1–0.9)
MONOCYTES # BLD AUTO: 0.74 10*3/MM3 (ref 0.1–0.9)
MONOCYTES # BLD AUTO: 0.78 10*3/MM3 (ref 0.1–0.9)
MONOCYTES # BLD AUTO: 0.79 10*3/MM3 (ref 0.1–0.9)
MONOCYTES NFR BLD AUTO: 11.4 % (ref 5–12)
MONOCYTES NFR BLD AUTO: 13 % (ref 5–12)
MONOCYTES NFR BLD AUTO: 13 % (ref 5–12)
MONOCYTES NFR BLD AUTO: 13.1 % (ref 5–12)
MONOCYTES NFR BLD AUTO: 13.3 % (ref 5–12)
MONOCYTES NFR BLD AUTO: 13.3 % (ref 5–12)
MONOCYTES NFR BLD AUTO: 13.4 % (ref 5–12)
MONOCYTES NFR BLD AUTO: 13.5 % (ref 5–12)
MONOCYTES NFR BLD AUTO: 14 % (ref 5–12)
MONOCYTES NFR BLD AUTO: 14 % (ref 5–12)
NEUTROPHILS # BLD AUTO: 2.38 10*3/MM3 (ref 1.7–7)
NEUTROPHILS # BLD AUTO: 2.51 10*3/MM3 (ref 1.7–7)
NEUTROPHILS # BLD AUTO: 2.74 10*3/MM3 (ref 1.7–7)
NEUTROPHILS # BLD AUTO: 2.75 10*3/MM3 (ref 1.7–7)
NEUTROPHILS # BLD AUTO: 2.8 10*3/MM3 (ref 1.7–7)
NEUTROPHILS # BLD AUTO: 2.92 10*3/MM3 (ref 1.7–7)
NEUTROPHILS # BLD AUTO: 2.97 10*3/MM3 (ref 1.7–7)
NEUTROPHILS # BLD AUTO: 3.05 10*3/MM3 (ref 1.7–7)
NEUTROPHILS # BLD AUTO: 3.2 10*3/MM3 (ref 1.7–7)
NEUTROPHILS # BLD AUTO: 3.55 10*3/MM3 (ref 1.7–7)
NEUTROPHILS # BLD AUTO: 3.69 10*3/MM3 (ref 1.7–7)
NEUTROPHILS # BLD AUTO: 3.75 10*3/MM3 (ref 1.7–7)
NEUTROPHILS # BLD AUTO: 3.84 10*3/MM3 (ref 1.7–7)
NEUTROPHILS NFR BLD AUTO: 50.9 % (ref 42.7–76)
NEUTROPHILS NFR BLD AUTO: 52.6 % (ref 42.7–76)
NEUTROPHILS NFR BLD AUTO: 53.2 % (ref 42.7–76)
NEUTROPHILS NFR BLD AUTO: 56.1 % (ref 42.7–76)
NEUTROPHILS NFR BLD AUTO: 57.3 % (ref 42.7–76)
NEUTROPHILS NFR BLD AUTO: 58 % (ref 42.7–76)
NEUTROPHILS NFR BLD AUTO: 58.4 % (ref 42.7–76)
NEUTROPHILS NFR BLD AUTO: 60 % (ref 42.7–76)
NEUTROPHILS NFR BLD AUTO: 60.1 % (ref 42.7–76)
NEUTROPHILS NFR BLD AUTO: 66 % (ref 42.7–76)
NEUTROPHILS NFR BLD MANUAL: 59 % (ref 42.7–76)
NEUTROPHILS NFR BLD MANUAL: 62 % (ref 42.7–76)
NEUTROPHILS NFR BLD MANUAL: 72 % (ref 42.7–76)
NEUTS BAND NFR BLD MANUAL: 4 % (ref 0–5)
NITRITE UR QL STRIP: NEGATIVE
NRBC BLD AUTO-RTO: 0 /100 WBC (ref 0–0.2)
NRBC BLD AUTO-RTO: 0.4 /100 WBC (ref 0–0.2)
NRBC BLD AUTO-RTO: 0.5 /100 WBC (ref 0–0.2)
NRBC BLD AUTO-RTO: 0.6 /100 WBC (ref 0–0.2)
NRBC BLD AUTO-RTO: 0.6 /100 WBC (ref 0–0.2)
NRBC BLD AUTO-RTO: 0.9 /100 WBC (ref 0–0.2)
NRBC BLD AUTO-RTO: 1.8 /100 WBC (ref 0–0.2)
NRBC BLD AUTO-RTO: 1.8 /100 WBC (ref 0–0.2)
NRBC BLD AUTO-RTO: 2.9 /100 WBC (ref 0–0.2)
NRBC BLD AUTO-RTO: 4.1 /100 WBC (ref 0–0.2)
NRBC SPEC MANUAL: 3 /100 WBC (ref 0–0.2)
NRBC SPEC MANUAL: 5 /100 WBC (ref 0–0.2)
NRBC SPEC MANUAL: 7 /100 WBC (ref 0–0.2)
NT-PROBNP SERPL-MCNC: 5367 PG/ML (ref 5–1800)
NT-PROBNP SERPL-MCNC: 5565 PG/ML (ref 5–1800)
NT-PROBNP SERPL-MCNC: 5722 PG/ML (ref 5–1800)
NT-PROBNP SERPL-MCNC: 5963 PG/ML (ref 5–1800)
NT-PROBNP SERPL-MCNC: 6214 PG/ML (ref 5–1800)
NT-PROBNP SERPL-MCNC: 6382 PG/ML (ref 5–1800)
NT-PROBNP SERPL-MCNC: 8401 PG/ML (ref 5–1800)
PCO2 BLDA: 27.1 MM HG (ref 35–45)
PH BLDA: 7.34 PH UNITS (ref 7.35–7.45)
PH UR STRIP.AUTO: 5.5 [PH] (ref 5–9)
PHOSPHATE SERPL-MCNC: 2.8 MG/DL (ref 2.5–4.5)
PLAT MORPH BLD: NORMAL
PLATELET # BLD AUTO: 119 10*3/MM3 (ref 140–450)
PLATELET # BLD AUTO: 141 10*3/MM3 (ref 140–450)
PLATELET # BLD AUTO: 150 10*3/MM3 (ref 140–450)
PLATELET # BLD AUTO: 154 10*3/MM3 (ref 140–450)
PLATELET # BLD AUTO: 157 10*3/MM3 (ref 140–450)
PLATELET # BLD AUTO: 158 10*3/MM3 (ref 140–450)
PLATELET # BLD AUTO: 162 10*3/MM3 (ref 140–450)
PLATELET # BLD AUTO: 163 10*3/MM3 (ref 140–450)
PLATELET # BLD AUTO: 166 10*3/MM3 (ref 140–450)
PLATELET # BLD AUTO: 169 10*3/MM3 (ref 140–450)
PLATELET # BLD AUTO: 175 10*3/MM3 (ref 140–450)
PLATELET # BLD AUTO: 186 10*3/MM3 (ref 140–450)
PLATELET # BLD AUTO: 190 10*3/MM3 (ref 140–450)
PMV BLD AUTO: 10 FL (ref 6–12)
PMV BLD AUTO: 10.3 FL (ref 6–12)
PMV BLD AUTO: 10.4 FL (ref 6–12)
PMV BLD AUTO: 10.4 FL (ref 6–12)
PMV BLD AUTO: 10.5 FL (ref 6–12)
PMV BLD AUTO: 10.8 FL (ref 6–12)
PMV BLD AUTO: 9.6 FL (ref 6–12)
PMV BLD AUTO: 9.8 FL (ref 6–12)
PMV BLD AUTO: ABNORMAL FL
PO2 BLDA: 63 MM HG (ref 83–108)
POIKILOCYTOSIS BLD QL SMEAR: ABNORMAL
POIKILOCYTOSIS BLD QL SMEAR: ABNORMAL
POLYCHROMASIA BLD QL SMEAR: ABNORMAL
POTASSIUM BLD-SCNC: 2.3 MMOL/L (ref 3.5–5.2)
POTASSIUM BLD-SCNC: 3 MMOL/L (ref 3.5–5.2)
POTASSIUM BLD-SCNC: 3 MMOL/L (ref 3.5–5.2)
POTASSIUM BLD-SCNC: 3.2 MMOL/L (ref 3.5–5.2)
POTASSIUM BLD-SCNC: 3.2 MMOL/L (ref 3.5–5.2)
POTASSIUM BLD-SCNC: 3.3 MMOL/L (ref 3.5–5.2)
POTASSIUM BLD-SCNC: 3.6 MMOL/L (ref 3.5–5.2)
POTASSIUM BLD-SCNC: 3.7 MMOL/L (ref 3.5–5.2)
POTASSIUM BLD-SCNC: 3.7 MMOL/L (ref 3.5–5.2)
POTASSIUM BLD-SCNC: 4 MMOL/L (ref 3.5–5.2)
POTASSIUM BLD-SCNC: 4.1 MMOL/L (ref 3.5–5.2)
POTASSIUM BLD-SCNC: 4.1 MMOL/L (ref 3.5–5.2)
POTASSIUM BLD-SCNC: 4.2 MMOL/L (ref 3.5–5.2)
POTASSIUM BLD-SCNC: 4.2 MMOL/L (ref 3.5–5.2)
PROCALCITONIN SERPL-MCNC: 0.27 NG/ML (ref 0.1–0.25)
PROT SERPL-MCNC: 5.2 G/DL (ref 6–8.5)
PROT SERPL-MCNC: 5.7 G/DL (ref 6–8.5)
PROT SERPL-MCNC: 5.8 G/DL (ref 6–8.5)
PROT SERPL-MCNC: 5.9 G/DL (ref 6–8.5)
PROT SERPL-MCNC: 5.9 G/DL (ref 6–8.5)
PROT SERPL-MCNC: 6 G/DL (ref 6–8.5)
PROT SERPL-MCNC: 6 G/DL (ref 6–8.5)
PROT SERPL-MCNC: 6.1 G/DL (ref 6–8.5)
PROT SERPL-MCNC: 6.2 G/DL (ref 6–8.5)
PROT SERPL-MCNC: 6.2 G/DL (ref 6–8.5)
PROT UR QL STRIP: ABNORMAL
PROTHROMBIN TIME: 19.1 SECONDS (ref 11.1–15.3)
PROTHROMBIN TIME: 23.6 SECONDS (ref 11.1–15.3)
PROTHROMBIN TIME: 25.3 SECONDS (ref 11.1–15.3)
RBC # BLD AUTO: 4.88 10*6/MM3 (ref 3.77–5.28)
RBC # BLD AUTO: 4.92 10*6/MM3 (ref 3.77–5.28)
RBC # BLD AUTO: 4.95 10*6/MM3 (ref 3.77–5.28)
RBC # BLD AUTO: 4.99 10*6/MM3 (ref 3.77–5.28)
RBC # BLD AUTO: 5.08 10*6/MM3 (ref 3.77–5.28)
RBC # BLD AUTO: 5.12 10*6/MM3 (ref 3.77–5.28)
RBC # BLD AUTO: 5.26 10*6/MM3 (ref 3.77–5.28)
RBC # BLD AUTO: 5.31 10*6/MM3 (ref 3.77–5.28)
RBC # BLD AUTO: 5.31 10*6/MM3 (ref 3.77–5.28)
RBC # BLD AUTO: 5.33 10*6/MM3 (ref 3.77–5.28)
RBC # BLD AUTO: 5.39 10*6/MM3 (ref 3.77–5.28)
RBC # BLD AUTO: 5.45 10*6/MM3 (ref 3.77–5.28)
RBC # BLD AUTO: 5.84 10*6/MM3 (ref 3.77–5.28)
RBC # UR: ABNORMAL /HPF
RBC MORPH BLD: NORMAL
RBC MORPH BLD: NORMAL
REF LAB TEST METHOD: ABNORMAL
SAO2 % BLDCOA: 88.9 % (ref 94–99)
SMALL PLATELETS BLD QL SMEAR: ABNORMAL
SMALL PLATELETS BLD QL SMEAR: ADEQUATE
SMALL PLATELETS BLD QL SMEAR: ADEQUATE
SMUDGE CELLS IN BLOOD BY LIGHT MICROSCOPY: 1 /100 WBC
SODIUM BLD-SCNC: 130 MMOL/L (ref 136–145)
SODIUM BLD-SCNC: 132 MMOL/L (ref 136–145)
SODIUM BLD-SCNC: 134 MMOL/L (ref 136–145)
SODIUM BLD-SCNC: 135 MMOL/L (ref 136–145)
SODIUM BLD-SCNC: 137 MMOL/L (ref 136–145)
SODIUM BLD-SCNC: 138 MMOL/L (ref 136–145)
SODIUM BLD-SCNC: 139 MMOL/L (ref 136–145)
SP GR UR STRIP: 1.02 (ref 1–1.03)
SQUAMOUS #/AREA URNS HPF: ABNORMAL /HPF
T4 FREE SERPL-MCNC: 1.24 NG/DL (ref 0.93–1.7)
TARGETS BLD QL SMEAR: ABNORMAL
TARGETS BLD QL SMEAR: ABNORMAL
TRIGL SERPL-MCNC: 84 MG/DL (ref 0–150)
TROPONIN T SERPL-MCNC: 0.01 NG/ML (ref 0–0.03)
TROPONIN T SERPL-MCNC: 0.01 NG/ML (ref 0–0.03)
TROPONIN T SERPL-MCNC: 0.02 NG/ML (ref 0–0.03)
TROPONIN T SERPL-MCNC: 0.02 NG/ML (ref 0–0.03)
TSH SERPL DL<=0.05 MIU/L-ACNC: 2.33 UIU/ML (ref 0.27–4.2)
UROBILINOGEN UR QL STRIP: ABNORMAL
VENTILATOR MODE: ABNORMAL
VLDLC SERPL-MCNC: 16.8 MG/DL
WBC MORPH BLD: NORMAL
WBC NRBC COR # BLD: 3.96 10*3/MM3 (ref 3.4–10.8)
WBC NRBC COR # BLD: 4.8 10*3/MM3 (ref 3.4–10.8)
WBC NRBC COR # BLD: 4.87 10*3/MM3 (ref 3.4–10.8)
WBC NRBC COR # BLD: 4.91 10*3/MM3 (ref 3.4–10.8)
WBC NRBC COR # BLD: 4.93 10*3/MM3 (ref 3.4–10.8)
WBC NRBC COR # BLD: 5.13 10*3/MM3 (ref 3.4–10.8)
WBC NRBC COR # BLD: 5.15 10*3/MM3 (ref 3.4–10.8)
WBC NRBC COR # BLD: 5.25 10*3/MM3 (ref 3.4–10.8)
WBC NRBC COR # BLD: 5.37 10*3/MM3 (ref 3.4–10.8)
WBC NRBC COR # BLD: 5.58 10*3/MM3 (ref 3.4–10.8)
WBC NRBC COR # BLD: 5.65 10*3/MM3 (ref 3.4–10.8)
WBC NRBC COR # BLD: 5.68 10*3/MM3 (ref 3.4–10.8)
WBC NRBC COR # BLD: 6.5 10*3/MM3 (ref 3.4–10.8)
WBC UR QL AUTO: ABNORMAL /HPF
YEAST URNS QL MICRO: ABNORMAL /HPF

## 2020-01-01 PROCEDURE — 83880 ASSAY OF NATRIURETIC PEPTIDE: CPT | Performed by: NURSE PRACTITIONER

## 2020-01-01 PROCEDURE — 84100 ASSAY OF PHOSPHORUS: CPT | Performed by: HOSPITALIST

## 2020-01-01 PROCEDURE — 25010000002 PIPERACILLIN-TAZOBACTAM: Performed by: INTERNAL MEDICINE

## 2020-01-01 PROCEDURE — 25010000002 PIPERACILLIN SOD-TAZOBACTAM PER 1 G: Performed by: HOSPITALIST

## 2020-01-01 PROCEDURE — 94799 UNLISTED PULMONARY SVC/PX: CPT

## 2020-01-01 PROCEDURE — 93005 ELECTROCARDIOGRAM TRACING: CPT | Performed by: INTERNAL MEDICINE

## 2020-01-01 PROCEDURE — 85025 COMPLETE CBC W/AUTO DIFF WBC: CPT | Performed by: NURSE PRACTITIONER

## 2020-01-01 PROCEDURE — 93010 ELECTROCARDIOGRAM REPORT: CPT | Performed by: INTERNAL MEDICINE

## 2020-01-01 PROCEDURE — 25010000002 ENOXAPARIN PER 10 MG: Performed by: STUDENT IN AN ORGANIZED HEALTH CARE EDUCATION/TRAINING PROGRAM

## 2020-01-01 PROCEDURE — 82803 BLOOD GASES ANY COMBINATION: CPT

## 2020-01-01 PROCEDURE — G0378 HOSPITAL OBSERVATION PER HR: HCPCS

## 2020-01-01 PROCEDURE — 25010000002 PIPERACILLIN SOD-TAZOBACTAM PER 1 G: Performed by: NURSE PRACTITIONER

## 2020-01-01 PROCEDURE — 25010000002 ALBUMIN HUMAN 25% PER 50 ML: Performed by: INTERNAL MEDICINE

## 2020-01-01 PROCEDURE — 97535 SELF CARE MNGMENT TRAINING: CPT

## 2020-01-01 PROCEDURE — 80053 COMPREHEN METABOLIC PANEL: CPT | Performed by: HOSPITALIST

## 2020-01-01 PROCEDURE — 82962 GLUCOSE BLOOD TEST: CPT

## 2020-01-01 PROCEDURE — 80053 COMPREHEN METABOLIC PANEL: CPT | Performed by: NURSE PRACTITIONER

## 2020-01-01 PROCEDURE — 83735 ASSAY OF MAGNESIUM: CPT | Performed by: INTERNAL MEDICINE

## 2020-01-01 PROCEDURE — 85007 BL SMEAR W/DIFF WBC COUNT: CPT | Performed by: NURSE PRACTITIONER

## 2020-01-01 PROCEDURE — 97530 THERAPEUTIC ACTIVITIES: CPT

## 2020-01-01 PROCEDURE — 76937 US GUIDE VASCULAR ACCESS: CPT

## 2020-01-01 PROCEDURE — 25010000002 FUROSEMIDE PER 20 MG: Performed by: NURSE PRACTITIONER

## 2020-01-01 PROCEDURE — 25010000002 ALBUMIN HUMAN 25% PER 50 ML: Performed by: NURSE PRACTITIONER

## 2020-01-01 PROCEDURE — 84439 ASSAY OF FREE THYROXINE: CPT | Performed by: HOSPITALIST

## 2020-01-01 PROCEDURE — 94760 N-INVAS EAR/PLS OXIMETRY 1: CPT

## 2020-01-01 PROCEDURE — 84443 ASSAY THYROID STIM HORMONE: CPT | Performed by: HOSPITALIST

## 2020-01-01 PROCEDURE — 71046 X-RAY EXAM CHEST 2 VIEWS: CPT

## 2020-01-01 PROCEDURE — 36410 VNPNXR 3YR/> PHY/QHP DX/THER: CPT

## 2020-01-01 PROCEDURE — 97162 PT EVAL MOD COMPLEX 30 MIN: CPT

## 2020-01-01 PROCEDURE — 84132 ASSAY OF SERUM POTASSIUM: CPT | Performed by: NURSE PRACTITIONER

## 2020-01-01 PROCEDURE — P9047 ALBUMIN (HUMAN), 25%, 50ML: HCPCS | Performed by: INTERNAL MEDICINE

## 2020-01-01 PROCEDURE — 93005 ELECTROCARDIOGRAM TRACING: CPT | Performed by: NURSE PRACTITIONER

## 2020-01-01 PROCEDURE — 25010000002 HEPARIN (PORCINE) PER 1000 UNITS: Performed by: NURSE PRACTITIONER

## 2020-01-01 PROCEDURE — 76705 ECHO EXAM OF ABDOMEN: CPT

## 2020-01-01 PROCEDURE — 99225 PR SBSQ OBSERVATION CARE/DAY 25 MINUTES: CPT | Performed by: INTERNAL MEDICINE

## 2020-01-01 PROCEDURE — 99232 SBSQ HOSP IP/OBS MODERATE 35: CPT | Performed by: NURSE PRACTITIONER

## 2020-01-01 PROCEDURE — 25010000002 IOPAMIDOL 61 % SOLUTION: Performed by: HOSPITALIST

## 2020-01-01 PROCEDURE — 71045 X-RAY EXAM CHEST 1 VIEW: CPT

## 2020-01-01 PROCEDURE — 85730 THROMBOPLASTIN TIME PARTIAL: CPT | Performed by: HOSPITALIST

## 2020-01-01 PROCEDURE — 02HV33Z INSERTION OF INFUSION DEVICE INTO SUPERIOR VENA CAVA, PERCUTANEOUS APPROACH: ICD-10-PCS | Performed by: INTERNAL MEDICINE

## 2020-01-01 PROCEDURE — 25010000002 ONDANSETRON PER 1 MG: Performed by: HOSPITALIST

## 2020-01-01 PROCEDURE — 25010000002 CEFTRIAXONE PER 250 MG: Performed by: HOSPITALIST

## 2020-01-01 PROCEDURE — 82550 ASSAY OF CK (CPK): CPT | Performed by: INTERNAL MEDICINE

## 2020-01-01 PROCEDURE — 25010000002 PIPERACILLIN-TAZOBACTAM: Performed by: HOSPITALIST

## 2020-01-01 PROCEDURE — 84484 ASSAY OF TROPONIN QUANT: CPT | Performed by: INTERNAL MEDICINE

## 2020-01-01 PROCEDURE — 25010000002 FUROSEMIDE PER 20 MG: Performed by: INTERNAL MEDICINE

## 2020-01-01 PROCEDURE — 80061 LIPID PANEL: CPT | Performed by: HOSPITALIST

## 2020-01-01 PROCEDURE — 84484 ASSAY OF TROPONIN QUANT: CPT | Performed by: HOSPITALIST

## 2020-01-01 PROCEDURE — 25010000002 PIPERACILLIN-TAZOBACTAM: Performed by: NURSE PRACTITIONER

## 2020-01-01 PROCEDURE — 93321 DOPPLER ECHO F-UP/LMTD STD: CPT | Performed by: INTERNAL MEDICINE

## 2020-01-01 PROCEDURE — 74177 CT ABD & PELVIS W/CONTRAST: CPT

## 2020-01-01 PROCEDURE — 83735 ASSAY OF MAGNESIUM: CPT | Performed by: NURSE PRACTITIONER

## 2020-01-01 PROCEDURE — 25010000003 POTASSIUM CHLORIDE 10 MEQ/100ML SOLUTION: Performed by: NURSE PRACTITIONER

## 2020-01-01 PROCEDURE — 99232 SBSQ HOSP IP/OBS MODERATE 35: CPT | Performed by: INTERNAL MEDICINE

## 2020-01-01 PROCEDURE — 97116 GAIT TRAINING THERAPY: CPT

## 2020-01-01 PROCEDURE — 83735 ASSAY OF MAGNESIUM: CPT | Performed by: HOSPITALIST

## 2020-01-01 PROCEDURE — 25010000002 FUROSEMIDE PER 20 MG: Performed by: HOSPITALIST

## 2020-01-01 PROCEDURE — 0 TECHNETIUM TC 99M MEBROFENIN KIT: Performed by: SURGERY

## 2020-01-01 PROCEDURE — P9047 ALBUMIN (HUMAN), 25%, 50ML: HCPCS | Performed by: NURSE PRACTITIONER

## 2020-01-01 PROCEDURE — 83880 ASSAY OF NATRIURETIC PEPTIDE: CPT | Performed by: HOSPITALIST

## 2020-01-01 PROCEDURE — 85730 THROMBOPLASTIN TIME PARTIAL: CPT | Performed by: NURSE PRACTITIONER

## 2020-01-01 PROCEDURE — 81001 URINALYSIS AUTO W/SCOPE: CPT | Performed by: INTERNAL MEDICINE

## 2020-01-01 PROCEDURE — 83605 ASSAY OF LACTIC ACID: CPT | Performed by: HOSPITALIST

## 2020-01-01 PROCEDURE — 83690 ASSAY OF LIPASE: CPT | Performed by: HOSPITALIST

## 2020-01-01 PROCEDURE — 93308 TTE F-UP OR LMTD: CPT | Performed by: INTERNAL MEDICINE

## 2020-01-01 PROCEDURE — 85610 PROTHROMBIN TIME: CPT | Performed by: NURSE PRACTITIONER

## 2020-01-01 PROCEDURE — 93308 TTE F-UP OR LMTD: CPT

## 2020-01-01 PROCEDURE — C1751 CATH, INF, PER/CENT/MIDLINE: HCPCS

## 2020-01-01 PROCEDURE — 93005 ELECTROCARDIOGRAM TRACING: CPT | Performed by: HOSPITALIST

## 2020-01-01 PROCEDURE — 94640 AIRWAY INHALATION TREATMENT: CPT

## 2020-01-01 PROCEDURE — 85610 PROTHROMBIN TIME: CPT | Performed by: HOSPITALIST

## 2020-01-01 PROCEDURE — 73030 X-RAY EXAM OF SHOULDER: CPT

## 2020-01-01 PROCEDURE — 78227 HEPATOBIL SYST IMAGE W/DRUG: CPT

## 2020-01-01 PROCEDURE — 83036 HEMOGLOBIN GLYCOSYLATED A1C: CPT | Performed by: HOSPITALIST

## 2020-01-01 PROCEDURE — 25010000002 MORPHINE PER 10 MG: Performed by: RADIOLOGY

## 2020-01-01 PROCEDURE — 85027 COMPLETE CBC AUTOMATED: CPT | Performed by: INTERNAL MEDICINE

## 2020-01-01 PROCEDURE — 93321 DOPPLER ECHO F-UP/LMTD STD: CPT

## 2020-01-01 PROCEDURE — 63710000001 INSULIN ASPART PER 5 UNITS: Performed by: HOSPITALIST

## 2020-01-01 PROCEDURE — 97166 OT EVAL MOD COMPLEX 45 MIN: CPT

## 2020-01-01 PROCEDURE — 82553 CREATINE MB FRACTION: CPT | Performed by: INTERNAL MEDICINE

## 2020-01-01 PROCEDURE — 97110 THERAPEUTIC EXERCISES: CPT

## 2020-01-01 PROCEDURE — A9537 TC99M MEBROFENIN: HCPCS | Performed by: SURGERY

## 2020-01-01 PROCEDURE — 85025 COMPLETE CBC W/AUTO DIFF WBC: CPT | Performed by: HOSPITALIST

## 2020-01-01 PROCEDURE — 84145 PROCALCITONIN (PCT): CPT | Performed by: HOSPITALIST

## 2020-01-01 RX ORDER — MIDODRINE HYDROCHLORIDE 5 MG/1
5 TABLET ORAL EVERY 8 HOURS SCHEDULED
Status: DISCONTINUED | OUTPATIENT
Start: 2020-01-01 | End: 2020-01-01 | Stop reason: HOSPADM

## 2020-01-01 RX ORDER — BUDESONIDE AND FORMOTEROL FUMARATE DIHYDRATE 160; 4.5 UG/1; UG/1
2 AEROSOL RESPIRATORY (INHALATION)
Status: DISCONTINUED | OUTPATIENT
Start: 2020-01-01 | End: 2020-01-01 | Stop reason: HOSPADM

## 2020-01-01 RX ORDER — BISACODYL 5 MG/1
5 TABLET, DELAYED RELEASE ORAL DAILY PRN
Status: DISCONTINUED | OUTPATIENT
Start: 2020-01-01 | End: 2020-01-01 | Stop reason: HOSPADM

## 2020-01-01 RX ORDER — FUROSEMIDE 40 MG/1
40 TABLET ORAL 2 TIMES DAILY
COMMUNITY
End: 2020-01-01 | Stop reason: HOSPADM

## 2020-01-01 RX ORDER — MAGNESIUM SULFATE HEPTAHYDRATE 40 MG/ML
2 INJECTION, SOLUTION INTRAVENOUS AS NEEDED
Status: DISCONTINUED | OUTPATIENT
Start: 2020-01-01 | End: 2020-01-01 | Stop reason: HOSPADM

## 2020-01-01 RX ORDER — KIT FOR THE PREPARATION OF TECHNETIUM TC 99M MEBROFENIN 45 MG/10ML
1 INJECTION, POWDER, LYOPHILIZED, FOR SOLUTION INTRAVENOUS
Status: COMPLETED | OUTPATIENT
Start: 2020-01-01 | End: 2020-01-01

## 2020-01-01 RX ORDER — HEPARIN SODIUM 10000 [USP'U]/100ML
18 INJECTION, SOLUTION INTRAVENOUS
Status: DISCONTINUED | OUTPATIENT
Start: 2020-01-01 | End: 2020-01-01 | Stop reason: ALTCHOICE

## 2020-01-01 RX ORDER — MAGNESIUM SULFATE HEPTAHYDRATE 40 MG/ML
4 INJECTION, SOLUTION INTRAVENOUS ONCE
Status: COMPLETED | OUTPATIENT
Start: 2020-01-01 | End: 2020-01-01

## 2020-01-01 RX ORDER — SODIUM CHLORIDE 0.9 % (FLUSH) 0.9 %
3 SYRINGE (ML) INJECTION EVERY 12 HOURS SCHEDULED
Status: DISCONTINUED | OUTPATIENT
Start: 2020-01-01 | End: 2020-01-01 | Stop reason: HOSPADM

## 2020-01-01 RX ORDER — POTASSIUM CHLORIDE 7.45 MG/ML
10 INJECTION INTRAVENOUS
Status: DISCONTINUED | OUTPATIENT
Start: 2020-01-01 | End: 2020-01-01 | Stop reason: HOSPADM

## 2020-01-01 RX ORDER — LANOLIN ALCOHOL/MO/W.PET/CERES
3 CREAM (GRAM) TOPICAL NIGHTLY PRN
Status: DISCONTINUED | OUTPATIENT
Start: 2020-01-01 | End: 2020-01-01 | Stop reason: HOSPADM

## 2020-01-01 RX ORDER — FUROSEMIDE 10 MG/ML
40 INJECTION INTRAMUSCULAR; INTRAVENOUS
Status: DISCONTINUED | OUTPATIENT
Start: 2020-01-01 | End: 2020-01-01

## 2020-01-01 RX ORDER — HEPARIN SODIUM 5000 [USP'U]/ML
40 INJECTION, SOLUTION INTRAVENOUS; SUBCUTANEOUS AS NEEDED
Status: DISCONTINUED | OUTPATIENT
Start: 2020-01-01 | End: 2020-01-01

## 2020-01-01 RX ORDER — ALBUMIN (HUMAN) 12.5 G/50ML
25 SOLUTION INTRAVENOUS DAILY
Status: DISCONTINUED | OUTPATIENT
Start: 2020-01-01 | End: 2020-01-01

## 2020-01-01 RX ORDER — POTASSIUM CHLORIDE 7.45 MG/ML
10 INJECTION INTRAVENOUS
Status: DISCONTINUED | OUTPATIENT
Start: 2020-01-01 | End: 2020-01-01

## 2020-01-01 RX ORDER — SODIUM POLYSTYRENE SULFONATE 15 G/60ML
30 SUSPENSION ORAL; RECTAL ONCE
Status: DISCONTINUED | OUTPATIENT
Start: 2020-01-01 | End: 2020-01-01

## 2020-01-01 RX ORDER — HEPARIN SODIUM 10000 [USP'U]/100ML
7 INJECTION, SOLUTION INTRAVENOUS
Status: DISCONTINUED | OUTPATIENT
Start: 2020-01-01 | End: 2020-01-01

## 2020-01-01 RX ORDER — HEPARIN SODIUM 5000 [USP'U]/ML
80 INJECTION, SOLUTION INTRAVENOUS; SUBCUTANEOUS ONCE
Status: COMPLETED | OUTPATIENT
Start: 2020-01-01 | End: 2020-01-01

## 2020-01-01 RX ORDER — HEPARIN SODIUM 5000 [USP'U]/ML
80 INJECTION, SOLUTION INTRAVENOUS; SUBCUTANEOUS AS NEEDED
Status: DISCONTINUED | OUTPATIENT
Start: 2020-01-01 | End: 2020-01-01 | Stop reason: ALTCHOICE

## 2020-01-01 RX ORDER — IPRATROPIUM BROMIDE AND ALBUTEROL SULFATE 2.5; .5 MG/3ML; MG/3ML
3 SOLUTION RESPIRATORY (INHALATION)
Status: DISCONTINUED | OUTPATIENT
Start: 2020-01-01 | End: 2020-01-01 | Stop reason: HOSPADM

## 2020-01-01 RX ORDER — HEPARIN SODIUM 5000 [USP'U]/ML
40 INJECTION, SOLUTION INTRAVENOUS; SUBCUTANEOUS AS NEEDED
Status: DISCONTINUED | OUTPATIENT
Start: 2020-01-01 | End: 2020-01-01 | Stop reason: ALTCHOICE

## 2020-01-01 RX ORDER — FUROSEMIDE 10 MG/ML
20 INJECTION INTRAMUSCULAR; INTRAVENOUS EVERY 12 HOURS
Status: DISCONTINUED | OUTPATIENT
Start: 2020-01-01 | End: 2020-01-01

## 2020-01-01 RX ORDER — HYDRALAZINE HYDROCHLORIDE 20 MG/ML
10 INJECTION INTRAMUSCULAR; INTRAVENOUS EVERY 4 HOURS PRN
Status: DISCONTINUED | OUTPATIENT
Start: 2020-01-01 | End: 2020-01-01 | Stop reason: HOSPADM

## 2020-01-01 RX ORDER — POTASSIUM CHLORIDE 1.5 G/1.77G
40 POWDER, FOR SOLUTION ORAL EVERY 4 HOURS
Status: ACTIVE | OUTPATIENT
Start: 2020-01-01 | End: 2020-01-01

## 2020-01-01 RX ORDER — FUROSEMIDE 10 MG/ML
40 INJECTION INTRAMUSCULAR; INTRAVENOUS EVERY 12 HOURS
Status: DISCONTINUED | OUTPATIENT
Start: 2020-01-01 | End: 2020-01-01

## 2020-01-01 RX ORDER — HEPARIN SODIUM 5000 [USP'U]/ML
80 INJECTION, SOLUTION INTRAVENOUS; SUBCUTANEOUS AS NEEDED
Status: DISCONTINUED | OUTPATIENT
Start: 2020-01-01 | End: 2020-01-01

## 2020-01-01 RX ORDER — MIDODRINE HYDROCHLORIDE 5 MG/1
5 TABLET ORAL EVERY 8 HOURS SCHEDULED
Start: 2020-01-01

## 2020-01-01 RX ORDER — POTASSIUM CHLORIDE 1.5 G/1.77G
40 POWDER, FOR SOLUTION ORAL AS NEEDED
Status: DISCONTINUED | OUTPATIENT
Start: 2020-01-01 | End: 2020-01-01

## 2020-01-01 RX ORDER — ONDANSETRON 4 MG/1
4 TABLET, FILM COATED ORAL EVERY 6 HOURS PRN
Status: DISCONTINUED | OUTPATIENT
Start: 2020-01-01 | End: 2020-01-01 | Stop reason: HOSPADM

## 2020-01-01 RX ORDER — DEXTROSE MONOHYDRATE 25 G/50ML
25 INJECTION, SOLUTION INTRAVENOUS
Status: DISCONTINUED | OUTPATIENT
Start: 2020-01-01 | End: 2020-01-01 | Stop reason: HOSPADM

## 2020-01-01 RX ORDER — HYDROCODONE BITARTRATE AND ACETAMINOPHEN 5; 325 MG/1; MG/1
1 TABLET ORAL EVERY 6 HOURS PRN
Status: DISCONTINUED | OUTPATIENT
Start: 2020-01-01 | End: 2020-01-01 | Stop reason: HOSPADM

## 2020-01-01 RX ORDER — POTASSIUM CHLORIDE 1.5 G/1.77G
40 POWDER, FOR SOLUTION ORAL
Status: DISCONTINUED | OUTPATIENT
Start: 2020-01-01 | End: 2020-01-01

## 2020-01-01 RX ORDER — NICOTINE POLACRILEX 4 MG
15 LOZENGE BUCCAL
Status: DISCONTINUED | OUTPATIENT
Start: 2020-01-01 | End: 2020-01-01 | Stop reason: HOSPADM

## 2020-01-01 RX ORDER — ALBUMIN (HUMAN) 12.5 G/50ML
25 SOLUTION INTRAVENOUS ONCE
Status: COMPLETED | OUTPATIENT
Start: 2020-01-01 | End: 2020-01-01

## 2020-01-01 RX ORDER — POTASSIUM CHLORIDE 750 MG/1
40 CAPSULE, EXTENDED RELEASE ORAL AS NEEDED
Status: DISCONTINUED | OUTPATIENT
Start: 2020-01-01 | End: 2020-01-01 | Stop reason: HOSPADM

## 2020-01-01 RX ORDER — BUDESONIDE AND FORMOTEROL FUMARATE DIHYDRATE 160; 4.5 UG/1; UG/1
2 AEROSOL RESPIRATORY (INHALATION)
Refills: 12
Start: 2020-01-01

## 2020-01-01 RX ORDER — ALBUMIN (HUMAN) 12.5 G/50ML
25 SOLUTION INTRAVENOUS 2 TIMES DAILY
Start: 2020-01-01

## 2020-01-01 RX ORDER — ALBUMIN (HUMAN) 12.5 G/50ML
25 SOLUTION INTRAVENOUS
Status: DISCONTINUED | OUTPATIENT
Start: 2020-01-01 | End: 2020-01-01 | Stop reason: HOSPADM

## 2020-01-01 RX ORDER — ASPIRIN 81 MG/1
81 TABLET, CHEWABLE ORAL DAILY
COMMUNITY
End: 2020-01-01 | Stop reason: HOSPADM

## 2020-01-01 RX ORDER — FUROSEMIDE 10 MG/ML
40 INJECTION INTRAMUSCULAR; INTRAVENOUS DAILY
Status: DISCONTINUED | OUTPATIENT
Start: 2020-01-01 | End: 2020-01-01

## 2020-01-01 RX ORDER — FUROSEMIDE 20 MG/1
20 TABLET ORAL DAILY
Status: DISCONTINUED | OUTPATIENT
Start: 2020-01-01 | End: 2020-01-01

## 2020-01-01 RX ORDER — CARVEDILOL 3.12 MG/1
3.12 TABLET ORAL 2 TIMES DAILY WITH MEALS
COMMUNITY
End: 2020-01-01 | Stop reason: HOSPADM

## 2020-01-01 RX ORDER — SODIUM CHLORIDE 0.9 % (FLUSH) 0.9 %
10 SYRINGE (ML) INJECTION EVERY 12 HOURS SCHEDULED
Status: DISCONTINUED | OUTPATIENT
Start: 2020-01-01 | End: 2020-01-01 | Stop reason: HOSPADM

## 2020-01-01 RX ORDER — POTASSIUM CHLORIDE 1.5 G/1.77G
40 POWDER, FOR SOLUTION ORAL AS NEEDED
Status: DISCONTINUED | OUTPATIENT
Start: 2020-01-01 | End: 2020-01-01 | Stop reason: HOSPADM

## 2020-01-01 RX ORDER — MAGNESIUM SULFATE HEPTAHYDRATE 40 MG/ML
4 INJECTION, SOLUTION INTRAVENOUS AS NEEDED
Status: DISCONTINUED | OUTPATIENT
Start: 2020-01-01 | End: 2020-01-01 | Stop reason: HOSPADM

## 2020-01-01 RX ORDER — ONDANSETRON 2 MG/ML
4 INJECTION INTRAMUSCULAR; INTRAVENOUS EVERY 4 HOURS PRN
Status: DISCONTINUED | OUTPATIENT
Start: 2020-01-01 | End: 2020-01-01 | Stop reason: HOSPADM

## 2020-01-01 RX ORDER — POTASSIUM CHLORIDE 750 MG/1
40 CAPSULE, EXTENDED RELEASE ORAL AS NEEDED
Status: DISCONTINUED | OUTPATIENT
Start: 2020-01-01 | End: 2020-01-01

## 2020-01-01 RX ORDER — DEXTROSE MONOHYDRATE 25 G/50ML
INJECTION, SOLUTION INTRAVENOUS
Status: DISCONTINUED
Start: 2020-01-01 | End: 2020-01-01 | Stop reason: HOSPADM

## 2020-01-01 RX ORDER — METOLAZONE 2.5 MG/1
2.5 TABLET ORAL ONCE
Status: COMPLETED | OUTPATIENT
Start: 2020-01-01 | End: 2020-01-01

## 2020-01-01 RX ORDER — RANITIDINE 150 MG/1
150 TABLET ORAL 2 TIMES DAILY
COMMUNITY
End: 2020-01-01 | Stop reason: HOSPADM

## 2020-01-01 RX ORDER — ONDANSETRON 2 MG/ML
4 INJECTION INTRAMUSCULAR; INTRAVENOUS EVERY 4 HOURS PRN
Start: 2020-01-01

## 2020-01-01 RX ORDER — DEXTROSE MONOHYDRATE 25 G/50ML
50 INJECTION, SOLUTION INTRAVENOUS
Status: DISCONTINUED | OUTPATIENT
Start: 2020-01-01 | End: 2020-01-01

## 2020-01-01 RX ORDER — SODIUM CHLORIDE 0.9 % (FLUSH) 0.9 %
10 SYRINGE (ML) INJECTION AS NEEDED
Status: DISCONTINUED | OUTPATIENT
Start: 2020-01-01 | End: 2020-01-01 | Stop reason: HOSPADM

## 2020-01-01 RX ORDER — FUROSEMIDE 40 MG/1
40 TABLET ORAL 3 TIMES DAILY
Status: DISCONTINUED | OUTPATIENT
Start: 2020-02-01 | End: 2020-01-01 | Stop reason: HOSPADM

## 2020-01-01 RX ORDER — MORPHINE SULFATE 2 MG/ML
1 INJECTION, SOLUTION INTRAMUSCULAR; INTRAVENOUS ONCE
Status: COMPLETED | OUTPATIENT
Start: 2020-01-01 | End: 2020-01-01

## 2020-01-01 RX ORDER — MIDODRINE HYDROCHLORIDE 5 MG/1
5 TABLET ORAL
Status: DISCONTINUED | OUTPATIENT
Start: 2020-01-01 | End: 2020-01-01

## 2020-01-01 RX ORDER — ACETAMINOPHEN 325 MG/1
650 TABLET ORAL EVERY 6 HOURS PRN
Status: DISCONTINUED | OUTPATIENT
Start: 2020-01-01 | End: 2020-01-01

## 2020-01-01 RX ORDER — FUROSEMIDE 10 MG/ML
40 INJECTION INTRAMUSCULAR; INTRAVENOUS EVERY 8 HOURS
Status: DISCONTINUED | OUTPATIENT
Start: 2020-01-30 | End: 2020-01-01 | Stop reason: HOSPADM

## 2020-01-01 RX ORDER — ATORVASTATIN CALCIUM 10 MG/1
10 TABLET, FILM COATED ORAL DAILY
Status: DISCONTINUED | OUTPATIENT
Start: 2020-01-01 | End: 2020-01-01

## 2020-01-01 RX ORDER — DEXTROSE MONOHYDRATE 25 G/50ML
INJECTION, SOLUTION INTRAVENOUS
Status: COMPLETED
Start: 2020-01-01 | End: 2020-01-01

## 2020-01-01 RX ORDER — BISACODYL 10 MG
10 SUPPOSITORY, RECTAL RECTAL DAILY PRN
Status: DISCONTINUED | OUTPATIENT
Start: 2020-01-01 | End: 2020-01-01 | Stop reason: HOSPADM

## 2020-01-01 RX ADMIN — ALBUMIN (HUMAN) 25 G: 0.25 INJECTION, SOLUTION INTRAVENOUS at 09:32

## 2020-01-01 RX ADMIN — FUROSEMIDE 40 MG: 10 INJECTION, SOLUTION INTRAMUSCULAR; INTRAVENOUS at 17:24

## 2020-01-01 RX ADMIN — PIPERACILLIN SODIUM,TAZOBACTAM SODIUM 3.38 G: 3; .375 INJECTION, POWDER, FOR SOLUTION INTRAVENOUS at 19:28

## 2020-01-01 RX ADMIN — SODIUM CHLORIDE, PRESERVATIVE FREE 10 ML: 5 INJECTION INTRAVENOUS at 20:48

## 2020-01-01 RX ADMIN — POTASSIUM CHLORIDE 40 MEQ: 1.5 POWDER, FOR SOLUTION ORAL at 10:37

## 2020-01-01 RX ADMIN — SODIUM CHLORIDE, PRESERVATIVE FREE 10 ML: 5 INJECTION INTRAVENOUS at 12:03

## 2020-01-01 RX ADMIN — IPRATROPIUM BROMIDE AND ALBUTEROL SULFATE 3 ML: 2.5; .5 SOLUTION RESPIRATORY (INHALATION) at 19:19

## 2020-01-01 RX ADMIN — SODIUM CHLORIDE, PRESERVATIVE FREE 10 ML: 5 INJECTION INTRAVENOUS at 09:22

## 2020-01-01 RX ADMIN — METOLAZONE 2.5 MG: 2.5 TABLET ORAL at 12:44

## 2020-01-01 RX ADMIN — ENOXAPARIN SODIUM 70 MG: 80 INJECTION SUBCUTANEOUS at 11:59

## 2020-01-01 RX ADMIN — MIDODRINE HYDROCHLORIDE 5 MG: 5 TABLET ORAL at 05:41

## 2020-01-01 RX ADMIN — ACETAMINOPHEN 650 MG: 325 TABLET, FILM COATED ORAL at 10:43

## 2020-01-01 RX ADMIN — MIDODRINE HYDROCHLORIDE 5 MG: 5 TABLET ORAL at 12:21

## 2020-01-01 RX ADMIN — ONDANSETRON 4 MG: 2 INJECTION INTRAMUSCULAR; INTRAVENOUS at 01:01

## 2020-01-01 RX ADMIN — IPRATROPIUM BROMIDE AND ALBUTEROL SULFATE 3 ML: 2.5; .5 SOLUTION RESPIRATORY (INHALATION) at 14:25

## 2020-01-01 RX ADMIN — MIDODRINE HYDROCHLORIDE 5 MG: 5 TABLET ORAL at 13:59

## 2020-01-01 RX ADMIN — PIPERACILLIN SODIUM,TAZOBACTAM SODIUM 3.38 G: 3; .375 INJECTION, POWDER, FOR SOLUTION INTRAVENOUS at 09:31

## 2020-01-01 RX ADMIN — SODIUM CHLORIDE, PRESERVATIVE FREE 10 ML: 5 INJECTION INTRAVENOUS at 08:52

## 2020-01-01 RX ADMIN — FUROSEMIDE 20 MG: 20 TABLET ORAL at 08:26

## 2020-01-01 RX ADMIN — SODIUM CHLORIDE, PRESERVATIVE FREE 10 ML: 5 INJECTION INTRAVENOUS at 20:15

## 2020-01-01 RX ADMIN — SODIUM BICARBONATE 50 MEQ: 84 INJECTION, SOLUTION INTRAVENOUS at 17:46

## 2020-01-01 RX ADMIN — IPRATROPIUM BROMIDE AND ALBUTEROL SULFATE 3 ML: 2.5; .5 SOLUTION RESPIRATORY (INHALATION) at 20:02

## 2020-01-01 RX ADMIN — Medication 15 G: at 10:17

## 2020-01-01 RX ADMIN — MIDODRINE HYDROCHLORIDE 5 MG: 5 TABLET ORAL at 05:03

## 2020-01-01 RX ADMIN — IPRATROPIUM BROMIDE AND ALBUTEROL SULFATE 3 ML: 2.5; .5 SOLUTION RESPIRATORY (INHALATION) at 15:14

## 2020-01-01 RX ADMIN — PIPERACILLIN SODIUM,TAZOBACTAM SODIUM 3.38 G: 3; .375 INJECTION, POWDER, FOR SOLUTION INTRAVENOUS at 09:14

## 2020-01-01 RX ADMIN — RIVAROXABAN 20 MG: 10 TABLET, FILM COATED ORAL at 18:00

## 2020-01-01 RX ADMIN — BUDESONIDE AND FORMOTEROL FUMARATE DIHYDRATE 2 PUFF: 160; 4.5 AEROSOL RESPIRATORY (INHALATION) at 07:28

## 2020-01-01 RX ADMIN — SODIUM CHLORIDE 100 ML: 9 INJECTION, SOLUTION INTRAVENOUS at 10:39

## 2020-01-01 RX ADMIN — POTASSIUM CHLORIDE 10 MEQ: 7.46 INJECTION, SOLUTION INTRAVENOUS at 15:48

## 2020-01-01 RX ADMIN — PIPERACILLIN SODIUM,TAZOBACTAM SODIUM 3.38 G: 3; .375 INJECTION, POWDER, FOR SOLUTION INTRAVENOUS at 17:46

## 2020-01-01 RX ADMIN — POTASSIUM CHLORIDE 10 MEQ: 7.46 INJECTION, SOLUTION INTRAVENOUS at 19:08

## 2020-01-01 RX ADMIN — MAGNESIUM SULFATE HEPTAHYDRATE 4 G: 40 INJECTION, SOLUTION INTRAVENOUS at 12:02

## 2020-01-01 RX ADMIN — SODIUM CHLORIDE 100 ML: 9 INJECTION, SOLUTION INTRAVENOUS at 12:37

## 2020-01-01 RX ADMIN — IPRATROPIUM BROMIDE AND ALBUTEROL SULFATE 3 ML: 2.5; .5 SOLUTION RESPIRATORY (INHALATION) at 11:12

## 2020-01-01 RX ADMIN — PIPERACILLIN SODIUM,TAZOBACTAM SODIUM 3.38 G: 3; .375 INJECTION, POWDER, FOR SOLUTION INTRAVENOUS at 10:58

## 2020-01-01 RX ADMIN — PIPERACILLIN SODIUM,TAZOBACTAM SODIUM 3.38 G: 3; .375 INJECTION, POWDER, FOR SOLUTION INTRAVENOUS at 01:41

## 2020-01-01 RX ADMIN — IPRATROPIUM BROMIDE AND ALBUTEROL SULFATE 3 ML: 2.5; .5 SOLUTION RESPIRATORY (INHALATION) at 14:48

## 2020-01-01 RX ADMIN — PIPERACILLIN SODIUM,TAZOBACTAM SODIUM 3.38 G: 3; .375 INJECTION, POWDER, FOR SOLUTION INTRAVENOUS at 01:50

## 2020-01-01 RX ADMIN — DEXTROSE MONOHYDRATE 25 G: 25 INJECTION, SOLUTION INTRAVENOUS at 07:10

## 2020-01-01 RX ADMIN — IPRATROPIUM BROMIDE AND ALBUTEROL SULFATE 3 ML: 2.5; .5 SOLUTION RESPIRATORY (INHALATION) at 11:18

## 2020-01-01 RX ADMIN — FUROSEMIDE 40 MG: 10 INJECTION, SOLUTION INTRAMUSCULAR; INTRAVENOUS at 09:24

## 2020-01-01 RX ADMIN — MIDODRINE HYDROCHLORIDE 5 MG: 5 TABLET ORAL at 21:21

## 2020-01-01 RX ADMIN — BUDESONIDE AND FORMOTEROL FUMARATE DIHYDRATE 2 PUFF: 160; 4.5 AEROSOL RESPIRATORY (INHALATION) at 07:35

## 2020-01-01 RX ADMIN — PIPERACILLIN SODIUM,TAZOBACTAM SODIUM 3.38 G: 3; .375 INJECTION, POWDER, FOR SOLUTION INTRAVENOUS at 12:04

## 2020-01-01 RX ADMIN — MIDODRINE HYDROCHLORIDE 5 MG: 5 TABLET ORAL at 15:32

## 2020-01-01 RX ADMIN — ATORVASTATIN CALCIUM 10 MG: 10 TABLET, FILM COATED ORAL at 08:01

## 2020-01-01 RX ADMIN — ACETAMINOPHEN 650 MG: 325 TABLET, FILM COATED ORAL at 08:26

## 2020-01-01 RX ADMIN — MIDODRINE HYDROCHLORIDE 5 MG: 5 TABLET ORAL at 09:27

## 2020-01-01 RX ADMIN — ENOXAPARIN SODIUM 70 MG: 80 INJECTION SUBCUTANEOUS at 11:51

## 2020-01-01 RX ADMIN — SODIUM BICARBONATE 50 MEQ: 84 INJECTION, SOLUTION INTRAVENOUS at 15:33

## 2020-01-01 RX ADMIN — MIDODRINE HYDROCHLORIDE 5 MG: 5 TABLET ORAL at 20:20

## 2020-01-01 RX ADMIN — PIPERACILLIN SODIUM AND TAZOBACTAM SODIUM 3.38 G: 3; .375 INJECTION, POWDER, LYOPHILIZED, FOR SOLUTION INTRAVENOUS at 16:18

## 2020-01-01 RX ADMIN — MIDODRINE HYDROCHLORIDE 5 MG: 5 TABLET ORAL at 20:57

## 2020-01-01 RX ADMIN — PIPERACILLIN SODIUM,TAZOBACTAM SODIUM 3.38 G: 3; .375 INJECTION, POWDER, FOR SOLUTION INTRAVENOUS at 15:04

## 2020-01-01 RX ADMIN — PIPERACILLIN SODIUM,TAZOBACTAM SODIUM 3.38 G: 3; .375 INJECTION, POWDER, FOR SOLUTION INTRAVENOUS at 10:36

## 2020-01-01 RX ADMIN — SODIUM CHLORIDE, PRESERVATIVE FREE 10 ML: 5 INJECTION INTRAVENOUS at 09:50

## 2020-01-01 RX ADMIN — IPRATROPIUM BROMIDE AND ALBUTEROL SULFATE 3 ML: 2.5; .5 SOLUTION RESPIRATORY (INHALATION) at 07:23

## 2020-01-01 RX ADMIN — IPRATROPIUM BROMIDE AND ALBUTEROL SULFATE 3 ML: 2.5; .5 SOLUTION RESPIRATORY (INHALATION) at 06:55

## 2020-01-01 RX ADMIN — DEXTROSE MONOHYDRATE 25 G: 25 INJECTION, SOLUTION INTRAVENOUS at 07:22

## 2020-01-01 RX ADMIN — HYDROCODONE BITARTRATE AND ACETAMINOPHEN 1 TABLET: 5; 325 TABLET ORAL at 12:44

## 2020-01-01 RX ADMIN — FUROSEMIDE 40 MG: 10 INJECTION, SOLUTION INTRAMUSCULAR; INTRAVENOUS at 17:10

## 2020-01-01 RX ADMIN — POTASSIUM CHLORIDE 40 MEQ: 750 CAPSULE, EXTENDED RELEASE ORAL at 16:27

## 2020-01-01 RX ADMIN — ATORVASTATIN CALCIUM 10 MG: 10 TABLET, FILM COATED ORAL at 10:36

## 2020-01-01 RX ADMIN — IPRATROPIUM BROMIDE AND ALBUTEROL SULFATE 3 ML: 2.5; .5 SOLUTION RESPIRATORY (INHALATION) at 07:26

## 2020-01-01 RX ADMIN — IOPAMIDOL 90 ML: 612 INJECTION, SOLUTION INTRAVENOUS at 12:00

## 2020-01-01 RX ADMIN — CEFTRIAXONE SODIUM 1 G: 1 INJECTION, POWDER, FOR SOLUTION INTRAMUSCULAR; INTRAVENOUS at 04:25

## 2020-01-01 RX ADMIN — POTASSIUM CHLORIDE 40 MEQ: 1.5 POWDER, FOR SOLUTION ORAL at 12:02

## 2020-01-01 RX ADMIN — PIPERACILLIN SODIUM,TAZOBACTAM SODIUM 3.38 G: 3; .375 INJECTION, POWDER, FOR SOLUTION INTRAVENOUS at 02:00

## 2020-01-01 RX ADMIN — IPRATROPIUM BROMIDE AND ALBUTEROL SULFATE 3 ML: 2.5; .5 SOLUTION RESPIRATORY (INHALATION) at 14:39

## 2020-01-01 RX ADMIN — FUROSEMIDE 5 MG/HR: 10 INJECTION, SOLUTION INTRAVENOUS at 11:38

## 2020-01-01 RX ADMIN — PIPERACILLIN SODIUM,TAZOBACTAM SODIUM 3.38 G: 3; .375 INJECTION, POWDER, FOR SOLUTION INTRAVENOUS at 17:10

## 2020-01-01 RX ADMIN — ACETAMINOPHEN 650 MG: 325 TABLET, FILM COATED ORAL at 20:20

## 2020-01-01 RX ADMIN — ATORVASTATIN CALCIUM 10 MG: 10 TABLET, FILM COATED ORAL at 09:27

## 2020-01-01 RX ADMIN — ACETAMINOPHEN 650 MG: 325 TABLET, FILM COATED ORAL at 10:38

## 2020-01-01 RX ADMIN — ALBUMIN HUMAN 25 G: 0.25 SOLUTION INTRAVENOUS at 12:19

## 2020-01-01 RX ADMIN — SODIUM CHLORIDE, PRESERVATIVE FREE 10 ML: 5 INJECTION INTRAVENOUS at 20:59

## 2020-01-01 RX ADMIN — BUDESONIDE AND FORMOTEROL FUMARATE DIHYDRATE 2 PUFF: 160; 4.5 AEROSOL RESPIRATORY (INHALATION) at 20:02

## 2020-01-01 RX ADMIN — ALBUMIN (HUMAN) 25 G: 0.25 INJECTION, SOLUTION INTRAVENOUS at 17:25

## 2020-01-01 RX ADMIN — PIPERACILLIN SODIUM,TAZOBACTAM SODIUM 3.38 G: 3; .375 INJECTION, POWDER, FOR SOLUTION INTRAVENOUS at 09:50

## 2020-01-01 RX ADMIN — SODIUM CHLORIDE, PRESERVATIVE FREE 10 ML: 5 INJECTION INTRAVENOUS at 11:59

## 2020-01-01 RX ADMIN — SODIUM CHLORIDE, PRESERVATIVE FREE 10 ML: 5 INJECTION INTRAVENOUS at 21:56

## 2020-01-01 RX ADMIN — BUDESONIDE AND FORMOTEROL FUMARATE DIHYDRATE 2 PUFF: 160; 4.5 AEROSOL RESPIRATORY (INHALATION) at 07:23

## 2020-01-01 RX ADMIN — FUROSEMIDE 40 MG: 10 INJECTION, SOLUTION INTRAMUSCULAR; INTRAVENOUS at 09:14

## 2020-01-01 RX ADMIN — PIPERACILLIN SODIUM,TAZOBACTAM SODIUM 3.38 G: 3; .375 INJECTION, POWDER, FOR SOLUTION INTRAVENOUS at 02:59

## 2020-01-01 RX ADMIN — PIPERACILLIN SODIUM,TAZOBACTAM SODIUM 3.38 G: 3; .375 INJECTION, POWDER, FOR SOLUTION INTRAVENOUS at 21:00

## 2020-01-01 RX ADMIN — MIDODRINE HYDROCHLORIDE 5 MG: 5 TABLET ORAL at 20:59

## 2020-01-01 RX ADMIN — ALBUMIN (HUMAN) 25 G: 0.25 INJECTION, SOLUTION INTRAVENOUS at 17:46

## 2020-01-01 RX ADMIN — MIDODRINE HYDROCHLORIDE 5 MG: 5 TABLET ORAL at 20:48

## 2020-01-01 RX ADMIN — SODIUM CHLORIDE, PRESERVATIVE FREE 10 ML: 5 INJECTION INTRAVENOUS at 08:58

## 2020-01-01 RX ADMIN — PIPERACILLIN SODIUM,TAZOBACTAM SODIUM 3.38 G: 3; .375 INJECTION, POWDER, FOR SOLUTION INTRAVENOUS at 04:02

## 2020-01-01 RX ADMIN — ATORVASTATIN CALCIUM 10 MG: 10 TABLET, FILM COATED ORAL at 08:57

## 2020-01-01 RX ADMIN — SODIUM BICARBONATE 50 MEQ: 84 INJECTION, SOLUTION INTRAVENOUS at 20:14

## 2020-01-01 RX ADMIN — PIPERACILLIN SODIUM,TAZOBACTAM SODIUM 3.38 G: 3; .375 INJECTION, POWDER, FOR SOLUTION INTRAVENOUS at 06:35

## 2020-01-01 RX ADMIN — HYDROCODONE BITARTRATE AND ACETAMINOPHEN 1 TABLET: 5; 325 TABLET ORAL at 11:38

## 2020-01-01 RX ADMIN — MIDODRINE HYDROCHLORIDE 5 MG: 5 TABLET ORAL at 12:44

## 2020-01-01 RX ADMIN — HEPARIN SODIUM 18 UNITS/KG/HR: 10000 INJECTION, SOLUTION INTRAVENOUS at 14:06

## 2020-01-01 RX ADMIN — ENOXAPARIN SODIUM 70 MG: 80 INJECTION SUBCUTANEOUS at 12:02

## 2020-01-01 RX ADMIN — PIPERACILLIN SODIUM,TAZOBACTAM SODIUM 3.38 G: 3; .375 INJECTION, POWDER, FOR SOLUTION INTRAVENOUS at 17:24

## 2020-01-01 RX ADMIN — SODIUM CHLORIDE, PRESERVATIVE FREE 10 ML: 5 INJECTION INTRAVENOUS at 10:45

## 2020-01-01 RX ADMIN — FUROSEMIDE 5 MG/HR: 10 INJECTION, SOLUTION INTRAVENOUS at 03:36

## 2020-01-01 RX ADMIN — DEXTROSE MONOHYDRATE 25 G: 25 INJECTION, SOLUTION INTRAVENOUS at 19:40

## 2020-01-01 RX ADMIN — PIPERACILLIN SODIUM,TAZOBACTAM SODIUM 3.38 G: 3; .375 INJECTION, POWDER, FOR SOLUTION INTRAVENOUS at 04:36

## 2020-01-01 RX ADMIN — DEXTROSE MONOHYDRATE 50 ML: 25 INJECTION, SOLUTION INTRAVENOUS at 02:43

## 2020-01-01 RX ADMIN — IPRATROPIUM BROMIDE AND ALBUTEROL SULFATE 3 ML: 2.5; .5 SOLUTION RESPIRATORY (INHALATION) at 19:28

## 2020-01-01 RX ADMIN — ENOXAPARIN SODIUM 70 MG: 80 INJECTION SUBCUTANEOUS at 10:30

## 2020-01-01 RX ADMIN — MIDODRINE HYDROCHLORIDE 5 MG: 5 TABLET ORAL at 16:24

## 2020-01-01 RX ADMIN — MEBROFENIN 1 DOSE: 45 INJECTION, POWDER, LYOPHILIZED, FOR SOLUTION INTRAVENOUS at 05:55

## 2020-01-01 RX ADMIN — ALBUMIN (HUMAN) 25 G: 0.25 INJECTION, SOLUTION INTRAVENOUS at 08:52

## 2020-01-01 RX ADMIN — PIPERACILLIN SODIUM,TAZOBACTAM SODIUM 3.38 G: 3; .375 INJECTION, POWDER, FOR SOLUTION INTRAVENOUS at 17:21

## 2020-01-01 RX ADMIN — IPRATROPIUM BROMIDE AND ALBUTEROL SULFATE 3 ML: 2.5; .5 SOLUTION RESPIRATORY (INHALATION) at 07:22

## 2020-01-01 RX ADMIN — ENOXAPARIN SODIUM 70 MG: 80 INJECTION SUBCUTANEOUS at 12:05

## 2020-01-01 RX ADMIN — BUDESONIDE AND FORMOTEROL FUMARATE DIHYDRATE 2 PUFF: 160; 4.5 AEROSOL RESPIRATORY (INHALATION) at 19:20

## 2020-01-01 RX ADMIN — ENOXAPARIN SODIUM 70 MG: 80 INJECTION SUBCUTANEOUS at 10:58

## 2020-01-01 RX ADMIN — ACETAMINOPHEN 650 MG: 325 TABLET, FILM COATED ORAL at 20:59

## 2020-01-01 RX ADMIN — MIDODRINE HYDROCHLORIDE 5 MG: 5 TABLET ORAL at 05:23

## 2020-01-01 RX ADMIN — ENOXAPARIN SODIUM 70 MG: 80 INJECTION SUBCUTANEOUS at 11:38

## 2020-01-01 RX ADMIN — ALBUMIN (HUMAN) 25 G: 0.25 INJECTION, SOLUTION INTRAVENOUS at 09:14

## 2020-01-01 RX ADMIN — HEPARIN SODIUM 18 UNITS/KG/HR: 10000 INJECTION, SOLUTION INTRAVENOUS at 14:23

## 2020-01-01 RX ADMIN — ALBUMIN (HUMAN) 25 G: 0.25 INJECTION, SOLUTION INTRAVENOUS at 18:02

## 2020-01-01 RX ADMIN — POTASSIUM CHLORIDE 40 MEQ: 750 CAPSULE, EXTENDED RELEASE ORAL at 21:56

## 2020-01-01 RX ADMIN — BUDESONIDE AND FORMOTEROL FUMARATE DIHYDRATE 2 PUFF: 160; 4.5 AEROSOL RESPIRATORY (INHALATION) at 19:31

## 2020-01-01 RX ADMIN — ATORVASTATIN CALCIUM 10 MG: 10 TABLET, FILM COATED ORAL at 08:47

## 2020-01-01 RX ADMIN — ONDANSETRON 4 MG: 2 INJECTION INTRAMUSCULAR; INTRAVENOUS at 04:54

## 2020-01-01 RX ADMIN — FUROSEMIDE 40 MG: 10 INJECTION, SOLUTION INTRAMUSCULAR; INTRAVENOUS at 19:03

## 2020-01-01 RX ADMIN — IPRATROPIUM BROMIDE AND ALBUTEROL SULFATE 3 ML: 2.5; .5 SOLUTION RESPIRATORY (INHALATION) at 11:03

## 2020-01-01 RX ADMIN — HEPARIN SODIUM 4600 UNITS: 5000 INJECTION INTRAVENOUS; SUBCUTANEOUS at 14:24

## 2020-01-01 RX ADMIN — SODIUM CHLORIDE, PRESERVATIVE FREE 10 ML: 5 INJECTION INTRAVENOUS at 09:14

## 2020-01-01 RX ADMIN — FUROSEMIDE 40 MG: 10 INJECTION, SOLUTION INTRAMUSCULAR; INTRAVENOUS at 18:59

## 2020-01-01 RX ADMIN — PIPERACILLIN SODIUM,TAZOBACTAM SODIUM 3.38 G: 3; .375 INJECTION, POWDER, FOR SOLUTION INTRAVENOUS at 10:38

## 2020-01-01 RX ADMIN — PIPERACILLIN SODIUM,TAZOBACTAM SODIUM 3.38 G: 3; .375 INJECTION, POWDER, FOR SOLUTION INTRAVENOUS at 22:17

## 2020-01-01 RX ADMIN — ATORVASTATIN CALCIUM 10 MG: 10 TABLET, FILM COATED ORAL at 08:26

## 2020-01-01 RX ADMIN — PIPERACILLIN SODIUM,TAZOBACTAM SODIUM 3.38 G: 3; .375 INJECTION, POWDER, FOR SOLUTION INTRAVENOUS at 02:18

## 2020-01-01 RX ADMIN — PIPERACILLIN SODIUM,TAZOBACTAM SODIUM 3.38 G: 3; .375 INJECTION, POWDER, FOR SOLUTION INTRAVENOUS at 17:45

## 2020-01-01 RX ADMIN — ONDANSETRON 4 MG: 2 INJECTION INTRAMUSCULAR; INTRAVENOUS at 11:27

## 2020-01-01 RX ADMIN — MIDODRINE HYDROCHLORIDE 5 MG: 5 TABLET ORAL at 21:32

## 2020-01-01 RX ADMIN — ALBUMIN HUMAN 25 G: 0.25 SOLUTION INTRAVENOUS at 09:25

## 2020-01-01 RX ADMIN — MORPHINE SULFATE 1 MG: 2 INJECTION, SOLUTION INTRAMUSCULAR; INTRAVENOUS at 07:18

## 2020-01-01 RX ADMIN — MIDODRINE HYDROCHLORIDE 5 MG: 5 TABLET ORAL at 13:08

## 2020-01-01 RX ADMIN — SODIUM CHLORIDE, PRESERVATIVE FREE 10 ML: 5 INJECTION INTRAVENOUS at 21:21

## 2020-01-01 RX ADMIN — POTASSIUM CHLORIDE 10 MEQ: 7.46 INJECTION, SOLUTION INTRAVENOUS at 18:12

## 2020-01-01 RX ADMIN — PIPERACILLIN SODIUM,TAZOBACTAM SODIUM 3.38 G: 3; .375 INJECTION, POWDER, FOR SOLUTION INTRAVENOUS at 18:49

## 2020-01-01 RX ADMIN — POTASSIUM CHLORIDE 40 MEQ: 1.5 POWDER, FOR SOLUTION ORAL at 07:59

## 2020-01-01 RX ADMIN — POTASSIUM CHLORIDE 40 MEQ: 1.5 POWDER, FOR SOLUTION ORAL at 12:05

## 2020-01-01 RX ADMIN — INSULIN ASPART 2 UNITS: 100 INJECTION, SOLUTION INTRAVENOUS; SUBCUTANEOUS at 17:44

## 2020-01-01 RX ADMIN — FUROSEMIDE 40 MG: 10 INJECTION, SOLUTION INTRAMUSCULAR; INTRAVENOUS at 09:31

## 2020-01-01 RX ADMIN — POTASSIUM CHLORIDE 40 MEQ: 750 CAPSULE, EXTENDED RELEASE ORAL at 02:19

## 2020-01-01 RX ADMIN — SODIUM CHLORIDE 100 ML: 9 INJECTION, SOLUTION INTRAVENOUS at 16:02

## 2020-01-01 RX ADMIN — HEPARIN SODIUM 4600 UNITS: 5000 INJECTION INTRAVENOUS; SUBCUTANEOUS at 14:04

## 2020-01-01 RX ADMIN — BUDESONIDE AND FORMOTEROL FUMARATE DIHYDRATE 2 PUFF: 160; 4.5 AEROSOL RESPIRATORY (INHALATION) at 06:55

## 2020-01-01 RX ADMIN — ALBUMIN (HUMAN) 25 G: 0.25 INJECTION, SOLUTION INTRAVENOUS at 17:11

## 2020-01-01 RX ADMIN — POTASSIUM CHLORIDE 10 MEQ: 7.46 INJECTION, SOLUTION INTRAVENOUS at 17:01

## 2020-01-01 RX ADMIN — SODIUM CHLORIDE, PRESERVATIVE FREE 10 ML: 5 INJECTION INTRAVENOUS at 09:27

## 2020-01-01 RX ADMIN — FUROSEMIDE 40 MG: 10 INJECTION, SOLUTION INTRAMUSCULAR; INTRAVENOUS at 06:35

## 2020-01-01 RX ADMIN — PIPERACILLIN SODIUM,TAZOBACTAM SODIUM 3.38 G: 3; .375 INJECTION, POWDER, FOR SOLUTION INTRAVENOUS at 10:30

## 2020-01-01 RX ADMIN — POTASSIUM CHLORIDE 40 MEQ: 1.5 POWDER, FOR SOLUTION ORAL at 09:21

## 2020-01-18 PROBLEM — R10.9 ABDOMINAL PAIN: Status: ACTIVE | Noted: 2020-01-01

## 2020-01-21 PROBLEM — Z74.09 IMPAIRED FUNCTIONAL MOBILITY, BALANCE, GAIT, AND ENDURANCE: Status: ACTIVE | Noted: 2020-01-01

## 2020-01-22 PROBLEM — J96.11 CHRONIC RESPIRATORY FAILURE WITH HYPOXIA (HCC): Status: ACTIVE | Noted: 2020-01-01

## 2020-01-23 PROBLEM — E11.9 DM (DIABETES MELLITUS) (HCC): Status: ACTIVE | Noted: 2020-01-01

## 2020-01-23 PROBLEM — J44.9 COPD (CHRONIC OBSTRUCTIVE PULMONARY DISEASE) (HCC): Status: ACTIVE | Noted: 2020-01-01

## 2020-01-23 PROBLEM — Z86.711 HISTORY OF PULMONARY EMBOLISM: Status: ACTIVE | Noted: 2020-01-01

## 2020-01-23 PROBLEM — K81.0 ACUTE CHOLECYSTITIS: Status: ACTIVE | Noted: 2020-01-01

## 2020-01-23 PROBLEM — I50.32 CHRONIC HEART FAILURE WITH PRESERVED EJECTION FRACTION (HCC): Status: ACTIVE | Noted: 2020-01-01

## 2020-01-24 PROBLEM — E87.6 HYPOKALEMIA: Status: ACTIVE | Noted: 2020-01-01

## 2020-01-24 PROBLEM — E87.1 HYPONATREMIA: Status: ACTIVE | Noted: 2020-01-01

## 2020-01-24 PROBLEM — I50.33 ACUTE ON CHRONIC HEART FAILURE WITH PRESERVED EJECTION FRACTION (HCC): Status: ACTIVE | Noted: 2020-01-01

## 2020-01-24 PROBLEM — I26.99 OTHER ACUTE PULMONARY EMBOLISM WITHOUT ACUTE COR PULMONALE (HCC): Status: ACTIVE | Noted: 2020-01-01

## 2020-01-27 PROBLEM — J96.21 ACUTE ON CHRONIC RESPIRATORY FAILURE WITH HYPOXIA (HCC): Status: ACTIVE | Noted: 2020-01-01

## (undated) DEVICE — ELECTRODE,RT,STRESS,FOAM,50PK: Brand: MEDLINE

## (undated) DEVICE — PK CATH LAB 60

## (undated) DEVICE — MODEL BT2000 P/N 700287-012KIT CONTENTS: MANIFOLD WITH SALINE AND CONTRAST PORTS, SALINE TUBING WITH SPIKE AND HAND SYRINGE, TRANSDUCER: Brand: BT2000 AUTOMATED MANIFOLD KIT

## (undated) DEVICE — ANGIOGRAPHIC CATHETER: Brand: EXPO™

## (undated) DEVICE — INTRO SHEATH ULTIMUM ACT 5F

## (undated) DEVICE — CATH DIAG EXPO .045 FL4 5F 100CM

## (undated) DEVICE — GW PERIPH GUIDERIGHT STD/J/TP PTFE/PCOAT SS 0.038IN 5X150CM

## (undated) DEVICE — PERCLOSE PROGLIDE™ SUTURE-MEDIATED CLOSURE SYSTEM: Brand: PERCLOSE PROGLIDE™

## (undated) DEVICE — ARTERIAL NEEDLE: Brand: UNBRANDED

## (undated) DEVICE — CATH DIAG EXPO .045 PIG 5F 100CM

## (undated) DEVICE — A2000 MULTI-USE SYRINGE KIT, P/N 701277-003KIT CONTENTS: 100ML CONTRAST RESERVOIR AND TUBING WITH CONTRAST SPIKE AND CLAMP: Brand: A2000 MULTI-USE SYRINGE KIT